# Patient Record
Sex: FEMALE | NOT HISPANIC OR LATINO | URBAN - METROPOLITAN AREA
[De-identification: names, ages, dates, MRNs, and addresses within clinical notes are randomized per-mention and may not be internally consistent; named-entity substitution may affect disease eponyms.]

---

## 2020-06-03 ENCOUNTER — OFFICE VISIT (OUTPATIENT)
Dept: URBAN - METROPOLITAN AREA SURGERY CENTER 5 | Facility: SURGERY CENTER | Age: 80
End: 2020-06-03

## 2020-07-09 ENCOUNTER — TELEPHONE ENCOUNTER (OUTPATIENT)
Dept: URBAN - METROPOLITAN AREA CLINIC 9 | Facility: CLINIC | Age: 80
End: 2020-07-09

## 2020-07-13 ENCOUNTER — OFFICE VISIT (OUTPATIENT)
Dept: URBAN - METROPOLITAN AREA CLINIC 7 | Facility: CLINIC | Age: 80
End: 2020-07-13

## 2020-08-13 ENCOUNTER — OFFICE VISIT (OUTPATIENT)
Dept: URBAN - METROPOLITAN AREA CLINIC 7 | Facility: CLINIC | Age: 80
End: 2020-08-13

## 2021-04-01 ENCOUNTER — OFFICE VISIT (OUTPATIENT)
Age: 81
End: 2021-04-01

## 2021-08-31 ENCOUNTER — TELEPHONE ENCOUNTER (OUTPATIENT)
Dept: URBAN - METROPOLITAN AREA CLINIC 9 | Facility: CLINIC | Age: 81
End: 2021-08-31

## 2021-09-01 ENCOUNTER — OFFICE VISIT (OUTPATIENT)
Age: 81
End: 2021-09-01

## 2021-10-04 ENCOUNTER — OFFICE VISIT (OUTPATIENT)
Dept: URBAN - METROPOLITAN AREA CLINIC 7 | Facility: CLINIC | Age: 81
End: 2021-10-04

## 2022-01-27 ENCOUNTER — OFFICE VISIT (OUTPATIENT)
Dept: URBAN - METROPOLITAN AREA CLINIC 7 | Facility: CLINIC | Age: 82
End: 2022-01-27

## 2022-01-27 ENCOUNTER — TELEPHONE ENCOUNTER (OUTPATIENT)
Dept: URBAN - METROPOLITAN AREA CLINIC 9 | Facility: CLINIC | Age: 82
End: 2022-01-27

## 2022-03-22 ENCOUNTER — OFFICE VISIT (OUTPATIENT)
Dept: URBAN - METROPOLITAN AREA SURGERY CENTER 5 | Facility: SURGERY CENTER | Age: 82
End: 2022-03-22

## 2022-07-30 ENCOUNTER — TELEPHONE ENCOUNTER (OUTPATIENT)
Age: 82
End: 2022-07-30

## 2022-07-30 RX ORDER — OMEPRAZOLE 20 MG/1
1 (ONE) TABLET, DELAYED RELEASE ORAL DAILY
Qty: 0 | Refills: 16 | OUTPATIENT
Start: 2015-04-02 | End: 2015-05-28

## 2022-07-30 RX ORDER — CYANOCOBALAMIN 1000 UG/ML
1 INJECTION INTRAMUSCULAR; SUBCUTANEOUS
Qty: 0 | Refills: 16 | OUTPATIENT
Start: 2012-05-16 | End: 2014-01-09

## 2022-07-30 RX ORDER — CIPROFLOXACIN HYDROCHLORIDE 500 MG/1
1 (ONE) TABLET, FILM COATED ORAL
Qty: 0 | Refills: 2 | OUTPATIENT
Start: 2017-08-18 | End: 2017-09-01

## 2022-07-30 RX ORDER — MESALAMINE 500 MG/1
2 (TWO) CAPSULE ORAL
Qty: 0 | Refills: 2 | OUTPATIENT
Start: 2020-05-14 | End: 2020-07-01

## 2022-07-30 RX ORDER — MESALAMINE 500 MG/1
2 (TWO) CAPSULE ER CAPSULE ORAL
Qty: 0 | Refills: 2 | OUTPATIENT
Start: 2015-09-22 | End: 2015-11-09

## 2022-07-30 RX ORDER — CIPROFLOXACIN HYDROCHLORIDE 500 MG/1
1 (ONE) TABLET, FILM COATED ORAL
Qty: 0 | Refills: 2 | OUTPATIENT
Start: 2015-10-21 | End: 2015-11-04

## 2022-07-30 RX ORDER — PREDNISONE 10 MG/1
1 TABLET ORAL
Qty: 0 | Refills: 2 | OUTPATIENT
Start: 2014-01-30 | End: 2014-02-20

## 2022-07-30 RX ORDER — PREDNISONE 10 MG/1
4 TABLET ORAL
Qty: 0 | Refills: 2 | OUTPATIENT
Start: 2018-03-19 | End: 2018-04-18

## 2022-07-30 RX ORDER — MESALAMINE 1000 MG/1
1 (ONE) SUPPOSITORY RECTAL AT BEDTIME
Qty: 0 | Refills: 2 | OUTPATIENT
Start: 2016-01-18 | End: 2016-02-17

## 2022-07-30 RX ORDER — HYDROCORTISONE ACETATE 25 MG/1
1 SUPPOSITORY RECTAL AT BEDTIME
Qty: 0 | Refills: 2 | OUTPATIENT
Start: 2012-11-30 | End: 2012-12-21

## 2022-07-30 RX ORDER — MESALAMINE 1000 MG/1
1 (ONE) SUPPOSITORY RECTAL AT BEDTIME
Qty: 0 | Refills: 3 | OUTPATIENT
Start: 2012-02-08 | End: 2012-05-10

## 2022-07-30 RX ORDER — CIPROFLOXACIN HCL 500 MG
1 (ONE) TABLET ORAL
Qty: 0 | Refills: 2 | OUTPATIENT
Start: 2012-04-26 | End: 2012-05-06

## 2022-07-30 RX ORDER — PREDNISONE 10 MG/1
4 TABLET ORAL DAILY
Qty: 0 | Refills: 3 | OUTPATIENT
Start: 2015-10-21 | End: 2016-09-21

## 2022-07-30 RX ORDER — OXYCODONE AND ACETAMINOPHEN 5; 325 MG/1; MG/1
1-2 TABLET ORAL
Qty: 0 | Refills: 2 | OUTPATIENT
Start: 2011-06-27 | End: 2011-07-05

## 2022-07-30 RX ORDER — METRONIDAZOLE 375 MG/1
1 CAPSULE ORAL
Qty: 0 | Refills: 2 | OUTPATIENT
Start: 2012-04-26 | End: 2012-05-06

## 2022-07-30 RX ORDER — PREDNISONE 10 MG/1
1 TABLET ORAL
Qty: 0 | Refills: 2 | OUTPATIENT
Start: 2012-05-10 | End: 2012-06-21

## 2022-07-30 RX ORDER — AMOXICILLIN/POTASSIUM CLAV 500-125 MG
1 TABLET ORAL
Qty: 0 | Refills: 2 | OUTPATIENT
Start: 2020-07-13 | End: 2020-07-23

## 2022-07-30 RX ORDER — PREDNISONE 10 MG/1
4 TABLET ORAL
Qty: 0 | Refills: 2 | OUTPATIENT
Start: 2020-04-20 | End: 2020-05-04

## 2022-07-30 RX ORDER — METRONIDAZOLE 375 MG/1
1 CAPSULE ORAL
Qty: 0 | Refills: 2 | OUTPATIENT
Start: 2011-06-27 | End: 2011-07-07

## 2022-07-30 RX ORDER — HYDROCORTISONE ACETATE 25 MG/1
1 SUPPOSITORY RECTAL AT BEDTIME
Qty: 0 | Refills: 2 | OUTPATIENT
Start: 2013-02-12 | End: 2013-03-05

## 2022-07-30 RX ORDER — PREDNISONE 10 MG/1
3 (THREE) TABLET ORAL DAILY
Qty: 0 | Refills: 3 | OUTPATIENT
Start: 2015-08-07 | End: 2015-09-21

## 2022-07-30 RX ORDER — PREDNISONE 10 MG/1
2 (TWO) TABLET ORAL DAILY
Qty: 0 | Refills: 3 | OUTPATIENT
Start: 2015-01-05 | End: 2015-04-02

## 2022-07-30 RX ORDER — CIPROFLOXACIN HCL 500 MG
1 (ONE) TABLET ORAL
Qty: 0 | Refills: 2 | OUTPATIENT
Start: 2014-01-09 | End: 2014-01-23

## 2022-07-30 RX ORDER — POLYETHYLENE GLYCOL 3350, SODIUM SULFATE, SODIUM CHLORIDE, POTASSIUM CHLORIDE, ASCORBIC ACID, SODIUM ASCORBATE 140-9-5.2G
1 (ONE) KIT ORAL AS DIRECTED
Qty: 0 | Refills: 2 | OUTPATIENT
Start: 2022-01-27 | End: 2022-01-28

## 2022-07-30 RX ORDER — CIPROFLOXACIN HYDROCHLORIDE 500 MG/1
1 (ONE) TABLET, FILM COATED ORAL
Qty: 0 | Refills: 2 | OUTPATIENT
Start: 2019-03-19 | End: 2019-03-29

## 2022-07-30 RX ORDER — METRONIDAZOLE 375 MG/1
1 CAPSULE ORAL
Qty: 0 | Refills: 2 | OUTPATIENT
Start: 2011-05-27 | End: 2011-06-06

## 2022-07-30 RX ORDER — MESALAMINE 1000 MG/1
1 (ONE) SUPPOSITORY RECTAL AT BEDTIME
Qty: 0 | Refills: 2 | OUTPATIENT
Start: 2013-10-07 | End: 2013-11-06

## 2022-07-30 RX ORDER — AMOXICILLIN AND CLAVULANATE POTASSIUM 500; 125 MG/1; MG/1
1 TABLET, FILM COATED ORAL
Qty: 0 | Refills: 2 | OUTPATIENT
Start: 2017-12-19 | End: 2017-12-26

## 2022-07-30 RX ORDER — METRONIDAZOLE 375 MG/1
1 CAPSULE ORAL
Qty: 0 | Refills: 2 | OUTPATIENT
Start: 2012-05-10 | End: 2012-05-24

## 2022-07-30 RX ORDER — PREDNISONE 10 MG/1
4 TABLET ORAL
Qty: 0 | Refills: 2 | OUTPATIENT
Start: 2019-10-31 | End: 2019-11-14

## 2022-07-30 RX ORDER — CIPROFLOXACIN HYDROCHLORIDE 500 MG/1
1 (ONE) TABLET, FILM COATED ORAL
Qty: 0 | Refills: 2 | OUTPATIENT
Start: 2020-04-20 | End: 2020-04-30

## 2022-07-30 RX ORDER — COLESTIPOL HYDROCHLORIDE 1 G/1
1 (ONE) TABLET TABLET ORAL
Qty: 0 | Refills: 7 | OUTPATIENT
Start: 2015-05-28 | End: 2015-08-21

## 2022-07-30 RX ORDER — ONDANSETRON HYDROCHLORIDE 4 MG/1
1 (ONE) TABLET, FILM COATED ORAL
Qty: 0 | Refills: 4 | OUTPATIENT
Start: 2020-05-14 | End: 2020-08-13

## 2022-07-30 RX ORDER — METRONIDAZOLE 375 MG/1
1 (ONE) CAPSULE ORAL
Qty: 0 | Refills: 2 | OUTPATIENT
Start: 2014-12-17 | End: 2014-12-31

## 2022-07-30 RX ORDER — METRONIDAZOLE 375 MG/1
1 CAPSULE ORAL
Qty: 0 | Refills: 2 | OUTPATIENT
Start: 2014-01-09 | End: 2014-01-23

## 2022-07-30 RX ORDER — COLESTIPOL HYDROCHLORIDE 1 G/1
1 (ONE) TABLET, FILM COATED ORAL
Qty: 0 | Refills: 5 | OUTPATIENT
Start: 2015-10-21 | End: 2016-01-18

## 2022-07-30 RX ORDER — SODIUM SULFATE, POTASSIUM SULFATE, MAGNESIUM SULFATE 17.5; 3.13; 1.6 G/ML; G/ML; G/ML
1 SOLUTION, CONCENTRATE ORAL AS DIRECTED
Qty: 0 | Refills: 2 | OUTPATIENT
Start: 2015-08-04 | End: 2015-08-21

## 2022-07-30 RX ORDER — PREDNISONE 10 MG/1
1 TABLET ORAL
Qty: 0 | Refills: 2 | OUTPATIENT
Start: 2011-06-27 | End: 2011-08-08

## 2022-07-30 RX ORDER — METRONIDAZOLE 250 MG/1
1 (ONE) TABLET ORAL
Qty: 0 | Refills: 2 | OUTPATIENT
Start: 2015-10-21 | End: 2015-11-04

## 2022-07-30 RX ORDER — METRONIDAZOLE 250 MG/1
1 (ONE) TABLET ORAL
Qty: 0 | Refills: 2 | OUTPATIENT
Start: 2017-08-18 | End: 2017-09-01

## 2022-07-30 RX ORDER — CIPROFLOXACIN HCL 500 MG
1 (ONE) TABLET ORAL
Qty: 0 | Refills: 2 | OUTPATIENT
Start: 2012-05-10 | End: 2012-05-24

## 2022-07-30 RX ORDER — PREDNISONE 10 MG/1
4 TABLET ORAL DAILY
Qty: 0 | Refills: 3 | OUTPATIENT
Start: 2017-08-18 | End: 2017-12-19

## 2022-07-30 RX ORDER — DIPHENOXYLATE HYDROCHLORIDE AND ATROPINE SULFATE 2.5; .025 MG/1; MG/1
1-2 TABLET TABLET ORAL
Qty: 0 | Refills: 2 | OUTPATIENT
Start: 2015-10-21 | End: 2015-11-20

## 2022-07-30 RX ORDER — ESOMEPRAZOLE MAGNESIUM 40 MG
1 (ONE) CAPSULE,DELAYED RELEASE (ENTERIC COATED) ORAL DAILY
Qty: 0 | Refills: 16 | OUTPATIENT
Start: 2011-06-27 | End: 2014-08-11

## 2022-07-30 RX ORDER — METRONIDAZOLE 500 MG/1
1 (ONE) TABLET TABLET ORAL
Qty: 0 | Refills: 2 | OUTPATIENT
Start: 2018-03-19 | End: 2018-03-29

## 2022-07-30 RX ORDER — FLUCONAZOLE 100 MG/1
1 (ONE) TABLET ORAL DAILY
Qty: 0 | Refills: 2 | OUTPATIENT
Start: 2017-10-27 | End: 2017-11-03

## 2022-07-30 RX ORDER — METRONIDAZOLE 375 MG/1
1 CAPSULE ORAL
Qty: 0 | Refills: 2 | OUTPATIENT
Start: 2011-10-25 | End: 2011-11-08

## 2022-07-30 RX ORDER — FOLIC ACID 1 MG/1
1 (ONE) TABLET TABLET ORAL DAILY
Qty: 0 | Refills: 16 | OUTPATIENT
Start: 2015-08-21 | End: 2016-09-21

## 2022-07-30 RX ORDER — MESALAMINE 1000 MG/1
1 SUPPOSITORY RECTAL DAILY
Qty: 0 | Refills: 2 | OUTPATIENT
Start: 2012-02-08 | End: 2012-02-17

## 2022-07-30 RX ORDER — PREDNISONE 10 MG/1
4 TABLET ORAL
Qty: 0 | Refills: 2 | OUTPATIENT
Start: 2019-03-19 | End: 2019-04-18

## 2022-07-30 RX ORDER — CIPROFLOXACIN HYDROCHLORIDE 500 MG/1
1 (ONE) TABLET, FILM COATED ORAL
Qty: 0 | Refills: 2 | OUTPATIENT
Start: 2018-03-19 | End: 2018-03-29

## 2022-07-30 RX ORDER — PREDNISONE 10 MG/1
4 TABLET ORAL
Qty: 0 | Refills: 2 | OUTPATIENT
Start: 2022-01-27 | End: 2022-02-10

## 2022-07-31 ENCOUNTER — TELEPHONE ENCOUNTER (OUTPATIENT)
Age: 82
End: 2022-07-31

## 2022-07-31 RX ORDER — MESALAMINE 1000 MG/1
1 (ONE) SUPPOSITORY RECTAL AT BEDTIME
Qty: 0 | Refills: 2 | Status: ACTIVE | COMMUNITY
Start: 2016-01-18

## 2022-07-31 RX ORDER — METRONIDAZOLE 375 MG/1
1 CAPSULE ORAL
Qty: 0 | Refills: 2 | Status: ACTIVE | COMMUNITY
Start: 2011-05-27

## 2022-07-31 RX ORDER — METRONIDAZOLE 250 MG/1
1 (ONE) TABLET ORAL
Qty: 0 | Refills: 2 | Status: ACTIVE | COMMUNITY
Start: 2017-08-18

## 2022-07-31 RX ORDER — PREDNISONE 10 MG/1
4 TABLET ORAL
Qty: 0 | Refills: 2 | Status: ACTIVE | COMMUNITY
Start: 2020-04-20

## 2022-07-31 RX ORDER — CIPROFLOXACIN HCL 500 MG
1 (ONE) TABLET ORAL
Qty: 0 | Refills: 2 | Status: ACTIVE | COMMUNITY
Start: 2012-05-10

## 2022-07-31 RX ORDER — FERROUS SULFATE 325(65) MG
1 TABLET ORAL
Qty: 0 | Refills: 16 | Status: ACTIVE | COMMUNITY
Start: 2015-08-21

## 2022-07-31 RX ORDER — PREDNISONE 10 MG/1
1 TABLET ORAL
Qty: 0 | Refills: 2 | Status: ACTIVE | COMMUNITY
Start: 2012-05-10

## 2022-07-31 RX ORDER — CIPROFLOXACIN HCL 500 MG
1 (ONE) TABLET ORAL
Qty: 0 | Refills: 2 | Status: ACTIVE | COMMUNITY
Start: 2012-04-26

## 2022-07-31 RX ORDER — PANTOPRAZOLE 20 MG/1
1 (ONE) TABLET, DELAYED RELEASE ORAL
Qty: 0 | Refills: 16 | Status: ACTIVE | COMMUNITY
Start: 2020-05-14

## 2022-07-31 RX ORDER — MESALAMINE 500 MG/1
2 (TWO) CAPSULE ORAL
Qty: 0 | Refills: 16 | Status: ACTIVE | COMMUNITY
Start: 2015-09-21

## 2022-07-31 RX ORDER — OMEPRAZOLE 20 MG/1
1 (ONE) TABLET, DELAYED RELEASE ORAL DAILY
Qty: 0 | Refills: 16 | Status: ACTIVE | COMMUNITY
Start: 2015-04-02

## 2022-07-31 RX ORDER — HYDROCORTISONE ACETATE 25 MG/1
1 SUPPOSITORY RECTAL AT BEDTIME
Qty: 0 | Refills: 2 | Status: ACTIVE | COMMUNITY
Start: 2012-11-30

## 2022-07-31 RX ORDER — CIPROFLOXACIN HCL 500 MG
1 (ONE) TABLET ORAL
Qty: 0 | Refills: 3 | Status: ACTIVE | COMMUNITY
Start: 2011-06-27

## 2022-07-31 RX ORDER — AMOXICILLIN AND CLAVULANATE POTASSIUM 500; 125 MG/1; MG/1
1 TABLET, FILM COATED ORAL
Qty: 0 | Refills: 2 | Status: ACTIVE | COMMUNITY
Start: 2017-12-19

## 2022-07-31 RX ORDER — METRONIDAZOLE 375 MG/1
1 (ONE) CAPSULE ORAL
Qty: 0 | Refills: 2 | Status: ACTIVE | COMMUNITY
Start: 2014-12-17

## 2022-07-31 RX ORDER — METRONIDAZOLE 375 MG/1
1 CAPSULE ORAL
Qty: 0 | Refills: 2 | Status: ACTIVE | COMMUNITY
Start: 2011-06-27

## 2022-07-31 RX ORDER — METRONIDAZOLE 500 MG/1
1 (ONE) TABLET TABLET ORAL
Qty: 0 | Refills: 2 | Status: ACTIVE | COMMUNITY
Start: 2018-03-19

## 2022-07-31 RX ORDER — BUDESONIDE 3 MG/1
1 (ONE) CAPSULE, COATED PELLETS ORAL
Qty: 0 | Refills: 3 | Status: ACTIVE | COMMUNITY
Start: 2020-08-13

## 2022-07-31 RX ORDER — FERROUS SULFATE 325(65) MG
1 TABLET ORAL
Qty: 0 | Refills: 16 | Status: ACTIVE | COMMUNITY
Start: 2012-05-16

## 2022-07-31 RX ORDER — PREDNISONE 10 MG/1
3 (THREE) TABLET ORAL DAILY
Qty: 0 | Refills: 3 | Status: ACTIVE | COMMUNITY
Start: 2015-08-07

## 2022-07-31 RX ORDER — METRONIDAZOLE 375 MG/1
1 CAPSULE ORAL
Qty: 0 | Refills: 2 | Status: ACTIVE | COMMUNITY
Start: 2012-04-26

## 2022-07-31 RX ORDER — CIPROFLOXACIN HYDROCHLORIDE 500 MG/1
1 (ONE) TABLET, FILM COATED ORAL
Qty: 0 | Refills: 2 | Status: ACTIVE | COMMUNITY
Start: 2017-08-18

## 2022-07-31 RX ORDER — PREDNISONE 10 MG/1
1 TABLET ORAL
Qty: 0 | Refills: 2 | Status: ACTIVE | COMMUNITY
Start: 2014-01-30

## 2022-07-31 RX ORDER — COLESTIPOL HYDROCHLORIDE 1 G/1
1 (ONE) TABLET, FILM COATED ORAL
Qty: 0 | Refills: 5 | Status: ACTIVE | COMMUNITY
Start: 2015-10-21

## 2022-07-31 RX ORDER — ONDANSETRON HYDROCHLORIDE 4 MG/1
1 (ONE) TABLET, FILM COATED ORAL
Qty: 0 | Refills: 4 | Status: ACTIVE | COMMUNITY
Start: 2020-05-14

## 2022-07-31 RX ORDER — MESALAMINE 500 MG/1
2 (TWO) CAPSULE ORAL
Qty: 0 | Refills: 2 | Status: ACTIVE | COMMUNITY
Start: 2020-05-14

## 2022-07-31 RX ORDER — CIPROFLOXACIN HYDROCHLORIDE 500 MG/1
1 (ONE) TABLET, FILM COATED ORAL
Qty: 0 | Refills: 2 | Status: ACTIVE | COMMUNITY
Start: 2020-04-20

## 2022-07-31 RX ORDER — METRONIDAZOLE 375 MG/1
1 CAPSULE ORAL
Qty: 0 | Refills: 2 | Status: ACTIVE | COMMUNITY
Start: 2012-05-10

## 2022-07-31 RX ORDER — FOLIC ACID 1 MG/1
1 (ONE) TABLET TABLET ORAL DAILY
Qty: 0 | Refills: 16 | Status: ACTIVE | COMMUNITY
Start: 2015-08-21

## 2022-07-31 RX ORDER — PREDNISONE 10 MG/1
4 TABLET ORAL
Qty: 0 | Refills: 2 | Status: ACTIVE | COMMUNITY
Start: 2019-10-31

## 2022-07-31 RX ORDER — PREDNISONE 10 MG/1
4 TABLET ORAL
Qty: 0 | Refills: 2 | Status: ACTIVE | COMMUNITY
Start: 2018-03-19

## 2022-07-31 RX ORDER — CIPROFLOXACIN HCL 500 MG
1 (ONE) TABLET ORAL
Qty: 0 | Refills: 2 | Status: ACTIVE | COMMUNITY
Start: 2014-01-09

## 2022-07-31 RX ORDER — AMOXICILLIN/POTASSIUM CLAV 500-125 MG
1 TABLET ORAL
Qty: 0 | Refills: 2 | Status: ACTIVE | COMMUNITY
Start: 2020-07-13

## 2022-07-31 RX ORDER — PREDNISONE 10 MG/1
4 TABLET ORAL DAILY
Qty: 0 | Refills: 3 | Status: ACTIVE | COMMUNITY
Start: 2017-08-18

## 2022-07-31 RX ORDER — MESALAMINE 1000 MG/1
1 SUPPOSITORY RECTAL DAILY
Qty: 0 | Refills: 2 | Status: ACTIVE | COMMUNITY
Start: 2012-02-08

## 2022-07-31 RX ORDER — MESALAMINE 1000 MG/1
1 (ONE) SUPPOSITORY RECTAL AT BEDTIME
Qty: 0 | Refills: 2 | Status: ACTIVE | COMMUNITY
Start: 2013-10-07

## 2022-07-31 RX ORDER — CYANOCOBALAMIN 1000 UG/ML
1 INJECTION INTRAMUSCULAR; SUBCUTANEOUS
Qty: 0 | Refills: 16 | Status: ACTIVE | COMMUNITY
Start: 2012-05-16

## 2022-07-31 RX ORDER — ESOMEPRAZOLE MAGNESIUM 40 MG
1 (ONE) CAPSULE,DELAYED RELEASE (ENTERIC COATED) ORAL DAILY
Qty: 0 | Refills: 16 | Status: ACTIVE | COMMUNITY
Start: 2011-06-27

## 2022-07-31 RX ORDER — MESALAMINE 500 MG/1
2 (TWO) CAPSULE ER CAPSULE ORAL
Qty: 0 | Refills: 2 | Status: ACTIVE | COMMUNITY
Start: 2015-09-22

## 2022-07-31 RX ORDER — PREDNISONE 10 MG/1
4 TABLET ORAL DAILY
Qty: 0 | Refills: 3 | Status: ACTIVE | COMMUNITY
Start: 2015-10-21

## 2022-07-31 RX ORDER — FLUCONAZOLE 100 MG/1
1 (ONE) TABLET ORAL DAILY
Qty: 0 | Refills: 2 | Status: ACTIVE | COMMUNITY
Start: 2017-10-27

## 2022-07-31 RX ORDER — PREDNISONE 10 MG/1
1 TABLET ORAL
Qty: 0 | Refills: 2 | Status: ACTIVE | COMMUNITY
Start: 2011-06-27

## 2022-07-31 RX ORDER — PREDNISONE 10 MG/1
4 TABLET ORAL
Qty: 0 | Refills: 2 | Status: ACTIVE | COMMUNITY
Start: 2019-03-19

## 2022-07-31 RX ORDER — OXYCODONE AND ACETAMINOPHEN 5; 325 MG/1; MG/1
1-2 TABLET ORAL
Qty: 0 | Refills: 2 | Status: ACTIVE | COMMUNITY
Start: 2011-06-27

## 2022-07-31 RX ORDER — PREDNISONE 10 MG/1
1 TABLET ORAL
Qty: 0 | Refills: 2 | Status: ACTIVE | COMMUNITY
Start: 2014-01-29

## 2022-07-31 RX ORDER — PREDNISONE 10 MG/1
4 TABLET ORAL
Qty: 0 | Refills: 2 | Status: ACTIVE | COMMUNITY
Start: 2022-01-27

## 2022-07-31 RX ORDER — POLYETHYLENE GLYCOL 3350, SODIUM SULFATE, SODIUM CHLORIDE, POTASSIUM CHLORIDE, ASCORBIC ACID, SODIUM ASCORBATE 140-9-5.2G
1 (ONE) KIT ORAL AS DIRECTED
Qty: 0 | Refills: 2 | Status: ACTIVE | COMMUNITY
Start: 2022-01-27

## 2022-07-31 RX ORDER — METRONIDAZOLE 375 MG/1
1 CAPSULE ORAL
Qty: 0 | Refills: 2 | Status: ACTIVE | COMMUNITY
Start: 2011-10-25

## 2022-07-31 RX ORDER — METRONIDAZOLE 375 MG/1
1 CAPSULE ORAL
Qty: 0 | Refills: 2 | Status: ACTIVE | COMMUNITY
Start: 2014-01-09

## 2022-07-31 RX ORDER — DIPHENOXYLATE HYDROCHLORIDE AND ATROPINE SULFATE 2.5; .025 MG/1; MG/1
1-2 TABLET ORAL
Qty: 0 | Refills: 2 | Status: ACTIVE | COMMUNITY
Start: 2015-10-21

## 2022-07-31 RX ORDER — PREDNISONE 10 MG/1
1 TABLET ORAL
Qty: 0 | Refills: 2 | Status: ACTIVE | COMMUNITY
Start: 2014-01-09

## 2022-07-31 RX ORDER — PREDNISONE 10 MG/1
1 TABLET ORAL DAILY
Qty: 0 | Refills: 3 | Status: ACTIVE | COMMUNITY
Start: 2015-09-21

## 2022-07-31 RX ORDER — HYDROCORTISONE ACETATE 25 MG/1
1 SUPPOSITORY RECTAL AT BEDTIME
Qty: 0 | Refills: 2 | Status: ACTIVE | COMMUNITY
Start: 2013-02-12

## 2022-07-31 RX ORDER — METRONIDAZOLE 250 MG/1
1 (ONE) TABLET ORAL
Qty: 0 | Refills: 2 | Status: ACTIVE | COMMUNITY
Start: 2015-10-21

## 2022-07-31 RX ORDER — CIPROFLOXACIN HYDROCHLORIDE 500 MG/1
1 (ONE) TABLET, FILM COATED ORAL
Qty: 0 | Refills: 2 | Status: ACTIVE | COMMUNITY
Start: 2019-03-19

## 2022-07-31 RX ORDER — SODIUM SULFATE, POTASSIUM SULFATE, MAGNESIUM SULFATE 17.5; 3.13; 1.6 G/ML; G/ML; G/ML
1 SOLUTION, CONCENTRATE ORAL AS DIRECTED
Qty: 0 | Refills: 2 | Status: ACTIVE | COMMUNITY
Start: 2015-08-04

## 2022-07-31 RX ORDER — CIPROFLOXACIN HYDROCHLORIDE 500 MG/1
1 (ONE) TABLET, FILM COATED ORAL
Qty: 0 | Refills: 2 | Status: ACTIVE | COMMUNITY
Start: 2018-03-19

## 2022-07-31 RX ORDER — COLESTIPOL HYDROCHLORIDE 1 G/1
1 (ONE) TABLET TABLET ORAL
Qty: 0 | Refills: 7 | Status: ACTIVE | COMMUNITY
Start: 2015-05-28

## 2022-07-31 RX ORDER — CIPROFLOXACIN HYDROCHLORIDE 500 MG/1
1 (ONE) TABLET, FILM COATED ORAL
Qty: 0 | Refills: 2 | Status: ACTIVE | COMMUNITY
Start: 2015-10-21

## 2022-07-31 RX ORDER — PREDNISONE 10 MG/1
2 (TWO) TABLET ORAL DAILY
Qty: 0 | Refills: 3 | Status: ACTIVE | COMMUNITY
Start: 2015-01-05

## 2022-10-14 ENCOUNTER — OFFICE VISIT (OUTPATIENT)
Dept: URBAN - METROPOLITAN AREA CLINIC 7 | Facility: CLINIC | Age: 82
End: 2022-10-14
Payer: COMMERCIAL

## 2022-10-14 ENCOUNTER — WEB ENCOUNTER (OUTPATIENT)
Dept: URBAN - METROPOLITAN AREA CLINIC 7 | Facility: CLINIC | Age: 82
End: 2022-10-14

## 2022-10-14 ENCOUNTER — LAB OUTSIDE AN ENCOUNTER (OUTPATIENT)
Dept: URBAN - METROPOLITAN AREA CLINIC 7 | Facility: CLINIC | Age: 82
End: 2022-10-14

## 2022-10-14 VITALS
BODY MASS INDEX: 21.85 KG/M2 | WEIGHT: 128 LBS | TEMPERATURE: 97.8 F | SYSTOLIC BLOOD PRESSURE: 180 MMHG | HEIGHT: 64 IN | DIASTOLIC BLOOD PRESSURE: 94 MMHG

## 2022-10-14 DIAGNOSIS — K60.2 ANAL FISSURE: ICD-10-CM

## 2022-10-14 DIAGNOSIS — R14.0 BLOATING: ICD-10-CM

## 2022-10-14 DIAGNOSIS — K50.90 CROHN'S DISEASE: ICD-10-CM

## 2022-10-14 DIAGNOSIS — R10.13 EPIGASTRIC PAIN: ICD-10-CM

## 2022-10-14 DIAGNOSIS — K29.70 GASTRITIS: ICD-10-CM

## 2022-10-14 DIAGNOSIS — K91.30 GASTROINTESTINAL ANASTOMOTIC STRICTURE: ICD-10-CM

## 2022-10-14 PROBLEM — 34000006 CROHN'S DISEASE: Status: ACTIVE | Noted: 2022-10-14

## 2022-10-14 PROCEDURE — 99214 OFFICE O/P EST MOD 30 MIN: CPT | Performed by: STUDENT IN AN ORGANIZED HEALTH CARE EDUCATION/TRAINING PROGRAM

## 2022-10-14 RX ORDER — FERROUS SULFATE 325(65) MG
1 TABLET ORAL
Qty: 0 | Refills: 16 | Status: DISCONTINUED | COMMUNITY
Start: 2012-05-16

## 2022-10-14 RX ORDER — FOLIC ACID 1 MG/1
1 (ONE) TABLET TABLET ORAL DAILY
Qty: 0 | Refills: 16 | Status: DISCONTINUED | COMMUNITY
Start: 2015-08-21

## 2022-10-14 RX ORDER — ESOMEPRAZOLE MAGNESIUM 40 MG
1 (ONE) CAPSULE,DELAYED RELEASE (ENTERIC COATED) ORAL DAILY
Qty: 0 | Refills: 16 | Status: DISCONTINUED | COMMUNITY
Start: 2011-06-27

## 2022-10-14 RX ORDER — PANTOPRAZOLE SODIUM 40 MG/1
1 TABLET TABLET, DELAYED RELEASE ORAL ONCE A DAY
Qty: 30 | Refills: 2 | OUTPATIENT

## 2022-10-14 RX ORDER — COLESTIPOL HYDROCHLORIDE 1 G/1
1 (ONE) TABLET, FILM COATED ORAL
Qty: 0 | Refills: 5 | Status: DISCONTINUED | COMMUNITY
Start: 2015-10-21

## 2022-10-14 RX ORDER — CIPROFLOXACIN HCL 500 MG
1 (ONE) TABLET ORAL
Qty: 0 | Refills: 2 | Status: DISCONTINUED | COMMUNITY
Start: 2014-01-09

## 2022-10-14 RX ORDER — PREDNISONE 10 MG/1
4 TABLET ORAL
Qty: 0 | Refills: 2 | Status: DISCONTINUED | COMMUNITY
Start: 2018-03-19

## 2022-10-14 RX ORDER — BUDESONIDE 3 MG/1
1 (ONE) CAPSULE, COATED PELLETS ORAL
Qty: 0 | Refills: 3 | Status: DISCONTINUED | COMMUNITY
Start: 2020-08-13

## 2022-10-14 RX ORDER — AMOXICILLIN AND CLAVULANATE POTASSIUM 500; 125 MG/1; MG/1
1 TABLET, FILM COATED ORAL
Qty: 0 | Refills: 2 | Status: DISCONTINUED | COMMUNITY
Start: 2017-12-19

## 2022-10-14 RX ORDER — COLESTIPOL HYDROCHLORIDE 1 G/1
1 (ONE) TABLET TABLET ORAL
Qty: 0 | Refills: 7 | Status: DISCONTINUED | COMMUNITY
Start: 2015-05-28

## 2022-10-14 RX ORDER — OMEPRAZOLE 20 MG/1
1 (ONE) TABLET, DELAYED RELEASE ORAL DAILY
Qty: 0 | Refills: 16 | Status: DISCONTINUED | COMMUNITY
Start: 2015-04-02

## 2022-10-14 RX ORDER — METRONIDAZOLE 375 MG/1
1 CAPSULE ORAL
Qty: 0 | Refills: 2 | Status: DISCONTINUED | COMMUNITY
Start: 2011-06-27

## 2022-10-14 RX ORDER — POLYETHYLENE GLYCOL 3350, SODIUM SULFATE, SODIUM CHLORIDE, POTASSIUM CHLORIDE, ASCORBIC ACID, SODIUM ASCORBATE 140-9-5.2G
1 (ONE) KIT ORAL AS DIRECTED
Qty: 0 | Refills: 2 | Status: DISCONTINUED | COMMUNITY
Start: 2022-01-27

## 2022-10-14 RX ORDER — METRONIDAZOLE 250 MG/1
1 (ONE) TABLET ORAL
Qty: 0 | Refills: 2 | Status: DISCONTINUED | COMMUNITY
Start: 2015-10-21

## 2022-10-14 RX ORDER — METRONIDAZOLE 500 MG/1
1 (ONE) TABLET TABLET ORAL
Qty: 0 | Refills: 2 | Status: DISCONTINUED | COMMUNITY
Start: 2018-03-19

## 2022-10-14 RX ORDER — CYANOCOBALAMIN 1000 UG/ML
1 INJECTION INTRAMUSCULAR; SUBCUTANEOUS
Qty: 0 | Refills: 16 | Status: DISCONTINUED | COMMUNITY
Start: 2012-05-16

## 2022-10-14 RX ORDER — OXYCODONE AND ACETAMINOPHEN 5; 325 MG/1; MG/1
1-2 TABLET ORAL
Qty: 0 | Refills: 2 | Status: DISCONTINUED | COMMUNITY
Start: 2011-06-27

## 2022-10-14 RX ORDER — SODIUM SULFATE, POTASSIUM SULFATE, MAGNESIUM SULFATE 17.5; 3.13; 1.6 G/ML; G/ML; G/ML
1 SOLUTION, CONCENTRATE ORAL AS DIRECTED
Qty: 0 | Refills: 2 | Status: DISCONTINUED | COMMUNITY
Start: 2015-08-04

## 2022-10-14 RX ORDER — MESALAMINE 500 MG/1
2 (TWO) CAPSULE ER CAPSULE ORAL
Qty: 0 | Refills: 2 | Status: DISCONTINUED | COMMUNITY
Start: 2015-09-22

## 2022-10-14 RX ORDER — ONDANSETRON HYDROCHLORIDE 4 MG/1
1 (ONE) TABLET, FILM COATED ORAL
Qty: 0 | Refills: 4 | Status: DISCONTINUED | COMMUNITY
Start: 2020-05-14

## 2022-10-14 RX ORDER — PANTOPRAZOLE 20 MG/1
1 (ONE) TABLET, DELAYED RELEASE ORAL
Qty: 0 | Refills: 16 | Status: DISCONTINUED | COMMUNITY
Start: 2020-05-14

## 2022-10-14 RX ORDER — CIPROFLOXACIN HYDROCHLORIDE 500 MG/1
1 (ONE) TABLET, FILM COATED ORAL
Qty: 0 | Refills: 2 | Status: DISCONTINUED | COMMUNITY
Start: 2020-04-20

## 2022-10-14 RX ORDER — FLUCONAZOLE 100 MG/1
1 (ONE) TABLET ORAL DAILY
Qty: 0 | Refills: 2 | Status: DISCONTINUED | COMMUNITY
Start: 2017-10-27

## 2022-10-14 RX ORDER — MESALAMINE 1000 MG/1
1 SUPPOSITORY RECTAL DAILY
Qty: 0 | Refills: 2 | Status: DISCONTINUED | COMMUNITY
Start: 2012-02-08

## 2022-10-14 RX ORDER — DIPHENOXYLATE HYDROCHLORIDE AND ATROPINE SULFATE 2.5; .025 MG/1; MG/1
1-2 TABLET ORAL
Qty: 0 | Refills: 2 | Status: DISCONTINUED | COMMUNITY
Start: 2015-10-21

## 2022-10-14 RX ORDER — LEVOTHYROXINE SODIUM 100 UG/1
1 TABLET IN THE MORNING ON AN EMPTY STOMACH TABLET ORAL ONCE A DAY
Status: ACTIVE | COMMUNITY

## 2022-10-14 RX ORDER — HYDROCORTISONE ACETATE 25 MG/1
1 SUPPOSITORY RECTAL AT BEDTIME
Qty: 0 | Refills: 2 | Status: DISCONTINUED | COMMUNITY
Start: 2013-02-12

## 2022-10-14 RX ORDER — AMOXICILLIN/POTASSIUM CLAV 500-125 MG
1 TABLET ORAL
Qty: 0 | Refills: 2 | Status: DISCONTINUED | COMMUNITY
Start: 2020-07-13

## 2022-10-17 ENCOUNTER — LAB OUTSIDE AN ENCOUNTER (OUTPATIENT)
Dept: URBAN - METROPOLITAN AREA SURGERY CENTER 5 | Facility: SURGERY CENTER | Age: 82
End: 2022-10-17

## 2022-10-17 ENCOUNTER — CLAIMS CREATED FROM THE CLAIM WINDOW (OUTPATIENT)
Dept: URBAN - METROPOLITAN AREA SURGERY CENTER 5 | Facility: SURGERY CENTER | Age: 82
End: 2022-10-17
Payer: COMMERCIAL

## 2022-10-17 ENCOUNTER — CLAIMS CREATED FROM THE CLAIM WINDOW (OUTPATIENT)
Dept: URBAN - METROPOLITAN AREA CLINIC 8 | Facility: CLINIC | Age: 82
End: 2022-10-17
Payer: COMMERCIAL

## 2022-10-17 DIAGNOSIS — K29.70 CHRONIC ACITVE GASTRITIS (H.PYLORI NEGATIVE): ICD-10-CM

## 2022-10-17 DIAGNOSIS — K50.112 CROHN'S COLITIS, WITH INTESTINAL OBSTRUCTION: ICD-10-CM

## 2022-10-17 DIAGNOSIS — K44.9 DIAPHRAGMATIC HERNIA: ICD-10-CM

## 2022-10-17 DIAGNOSIS — K62.89 ACUTE PROCTITIS: ICD-10-CM

## 2022-10-17 DIAGNOSIS — K29.50 ANTRAL GASTRITIS: ICD-10-CM

## 2022-10-17 DIAGNOSIS — K62.1 ANAL AND RECTAL POLYP: ICD-10-CM

## 2022-10-17 PROCEDURE — 43239 EGD BIOPSY SINGLE/MULTIPLE: CPT | Performed by: STUDENT IN AN ORGANIZED HEALTH CARE EDUCATION/TRAINING PROGRAM

## 2022-10-17 PROCEDURE — 88305 TISSUE EXAM BY PATHOLOGIST: CPT | Performed by: STUDENT IN AN ORGANIZED HEALTH CARE EDUCATION/TRAINING PROGRAM

## 2022-10-17 PROCEDURE — 45380 COLONOSCOPY AND BIOPSY: CPT | Performed by: STUDENT IN AN ORGANIZED HEALTH CARE EDUCATION/TRAINING PROGRAM

## 2022-10-17 PROCEDURE — 88312 SPECIAL STAINS GROUP 1: CPT | Performed by: STUDENT IN AN ORGANIZED HEALTH CARE EDUCATION/TRAINING PROGRAM

## 2022-10-17 RX ORDER — LEVOTHYROXINE SODIUM 100 UG/1
1 TABLET IN THE MORNING ON AN EMPTY STOMACH TABLET ORAL ONCE A DAY
Status: ACTIVE | COMMUNITY

## 2022-10-17 RX ORDER — PANTOPRAZOLE SODIUM 40 MG/1
1 TABLET TABLET, DELAYED RELEASE ORAL ONCE A DAY
Qty: 30 | Refills: 2 | Status: ACTIVE | COMMUNITY

## 2022-11-01 ENCOUNTER — OFFICE VISIT (OUTPATIENT)
Dept: URBAN - METROPOLITAN AREA CLINIC 9 | Facility: CLINIC | Age: 82
End: 2022-11-01

## 2022-11-04 ENCOUNTER — OFFICE VISIT (OUTPATIENT)
Dept: URBAN - METROPOLITAN AREA CLINIC 7 | Facility: CLINIC | Age: 82
End: 2022-11-04
Payer: COMMERCIAL

## 2022-11-04 VITALS
SYSTOLIC BLOOD PRESSURE: 136 MMHG | BODY MASS INDEX: 21.51 KG/M2 | HEIGHT: 64 IN | WEIGHT: 126 LBS | DIASTOLIC BLOOD PRESSURE: 80 MMHG | TEMPERATURE: 97.7 F

## 2022-11-04 DIAGNOSIS — R93.5 ABNORMAL ABDOMINAL CT SCAN: ICD-10-CM

## 2022-11-04 DIAGNOSIS — K56.699 STENOSIS COLON: ICD-10-CM

## 2022-11-04 DIAGNOSIS — K29.70 GASTRITIS: ICD-10-CM

## 2022-11-04 DIAGNOSIS — K50.812 CROHN'S DISEASE OF BOTH SMALL AND LARGE INTESTINE WITH INTESTINAL OBSTRUCTION: ICD-10-CM

## 2022-11-04 DIAGNOSIS — K50.90 CROHN'S DISEASE: ICD-10-CM

## 2022-11-04 DIAGNOSIS — K91.30 GASTROINTESTINAL ANASTOMOTIC STRICTURE: ICD-10-CM

## 2022-11-04 DIAGNOSIS — K63.89 SMALL INTESTINAL BACTERIAL OVERGROWTH: ICD-10-CM

## 2022-11-04 PROBLEM — 19132000: Status: ACTIVE | Noted: 2022-10-17

## 2022-11-04 PROCEDURE — 99214 OFFICE O/P EST MOD 30 MIN: CPT | Performed by: STUDENT IN AN ORGANIZED HEALTH CARE EDUCATION/TRAINING PROGRAM

## 2022-11-04 RX ORDER — LEVOTHYROXINE SODIUM 100 UG/1
1 TABLET IN THE MORNING ON AN EMPTY STOMACH TABLET ORAL ONCE A DAY
Status: ACTIVE | COMMUNITY

## 2022-11-04 RX ORDER — PANTOPRAZOLE SODIUM 40 MG/1
1 TABLET TABLET, DELAYED RELEASE ORAL ONCE A DAY
Qty: 30 | Refills: 2 | Status: ACTIVE | COMMUNITY

## 2022-11-04 NOTE — HPI-TODAY'S VISIT:
83 YO Female with presents for increased bloating, pyrosis, chronic GERD, previous history of fistualizing Crohn's disease with previous surgical correction.   States she has been having epigastric and lower abdominal pain associated with bloating for the past couple of months.  Previuos endoscopic history reviewed with patient.  States that she was previously seen with Dr. Garza and during flares would have endoscopci evaluation and medical treatment.  Thoroughly explained risk/benefits/alternatives of procedures.    Interval HIstory 11/4/22: Has sbeen feeling nauseous.  Previous Colonoscopy done with evidence of anastomotoci narrowing.  Follow up CT AP without evidence of stenosis or narrowing.  Will give patient a trial of miralax so allowing for better passage of stool and trial of anti-nausea medication.  Will re-evalaute for possible repeat colonosocpy after trial with dilation of stricture.  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

## 2022-11-15 ENCOUNTER — TELEPHONE ENCOUNTER (OUTPATIENT)
Dept: URBAN - METROPOLITAN AREA CLINIC 7 | Facility: CLINIC | Age: 82
End: 2022-11-15

## 2022-11-15 PROBLEM — 64226004: Status: ACTIVE | Noted: 2022-11-15

## 2022-11-15 PROBLEM — 422587007: Status: ACTIVE | Noted: 2022-11-15

## 2022-11-15 RX ORDER — ONDANSETRON 4 MG/1
1 TABLET ON THE TONGUE AND ALLOW TO DISSOLVE TABLET, ORALLY DISINTEGRATING ORAL ONCE A DAY
Qty: 30 | OUTPATIENT
Start: 2022-11-15

## 2022-11-15 RX ORDER — PREDNISONE 20 MG/1
1 TABLET TABLET ORAL ONCE A DAY
Qty: 30 | OUTPATIENT
Start: 2022-11-15 | End: 2022-12-15

## 2022-11-15 RX ORDER — PANTOPRAZOLE SODIUM 40 MG/1
1 TABLET TABLET, DELAYED RELEASE ORAL ONCE A DAY
Qty: 30 | Refills: 2 | Status: ACTIVE | COMMUNITY

## 2022-11-15 RX ORDER — LEVOTHYROXINE SODIUM 100 UG/1
1 TABLET IN THE MORNING ON AN EMPTY STOMACH TABLET ORAL ONCE A DAY
Status: ACTIVE | COMMUNITY

## 2022-11-22 ENCOUNTER — OFFICE VISIT (OUTPATIENT)
Dept: URBAN - METROPOLITAN AREA CLINIC 7 | Facility: CLINIC | Age: 82
End: 2022-11-22

## 2022-12-12 ENCOUNTER — LAB OUTSIDE AN ENCOUNTER (OUTPATIENT)
Dept: URBAN - METROPOLITAN AREA CLINIC 7 | Facility: CLINIC | Age: 82
End: 2022-12-12

## 2022-12-12 ENCOUNTER — OFFICE VISIT (OUTPATIENT)
Dept: URBAN - METROPOLITAN AREA CLINIC 7 | Facility: CLINIC | Age: 82
End: 2022-12-12
Payer: COMMERCIAL

## 2022-12-12 VITALS
TEMPERATURE: 98 F | BODY MASS INDEX: 21.41 KG/M2 | HEIGHT: 64 IN | SYSTOLIC BLOOD PRESSURE: 120 MMHG | WEIGHT: 125.4 LBS | DIASTOLIC BLOOD PRESSURE: 75 MMHG

## 2022-12-12 DIAGNOSIS — K50.90 CROHN'S DISEASE: ICD-10-CM

## 2022-12-12 DIAGNOSIS — K29.70 GASTRITIS: ICD-10-CM

## 2022-12-12 DIAGNOSIS — K59.09 CHRONIC CONSTIPATION: ICD-10-CM

## 2022-12-12 DIAGNOSIS — K91.30 ANASTOMOTIC STRICTURE OF COLORECTAL REGION: ICD-10-CM

## 2022-12-12 DIAGNOSIS — R10.13 EPIGASTRIC PAIN: ICD-10-CM

## 2022-12-12 PROBLEM — 236069009: Status: ACTIVE | Noted: 2022-12-12

## 2022-12-12 PROCEDURE — 99214 OFFICE O/P EST MOD 30 MIN: CPT | Performed by: STUDENT IN AN ORGANIZED HEALTH CARE EDUCATION/TRAINING PROGRAM

## 2022-12-12 RX ORDER — PANTOPRAZOLE SODIUM 40 MG/1
1 TABLET TABLET, DELAYED RELEASE ORAL ONCE A DAY
Qty: 30 | Refills: 2 | Status: ACTIVE | COMMUNITY

## 2022-12-12 RX ORDER — ONDANSETRON 4 MG/1
1 TABLET ON THE TONGUE AND ALLOW TO DISSOLVE TABLET, ORALLY DISINTEGRATING ORAL ONCE A DAY
Qty: 30 | Status: ACTIVE | COMMUNITY
Start: 2022-11-15

## 2022-12-12 RX ORDER — LEVOTHYROXINE SODIUM 100 UG/1
1 TABLET IN THE MORNING ON AN EMPTY STOMACH TABLET ORAL ONCE A DAY
Status: ACTIVE | COMMUNITY

## 2022-12-12 RX ORDER — PREDNISONE 20 MG/1
1 TABLET TABLET ORAL ONCE A DAY
Qty: 30 | Status: ACTIVE | COMMUNITY
Start: 2022-11-15 | End: 2022-12-15

## 2022-12-12 NOTE — HPI-TODAY'S VISIT:
81 YO Female with presents for increased bloating, pyrosis, chronic GERD, previous history of fistualizing Crohn's disease with previous surgical correction.   States she has been having epigastric and lower abdominal pain associated with bloating for the past couple of months.  Previuos endoscopic history reviewed with patient.  States that she was previously seen with Dr. Garza and during flares would have endoscopci evaluation and medical treatment.  Thoroughly explained risk/benefits/alternatives of procedures.    Interval HIstory 11/4/22: Has sbeen feeling nauseous.  Previous Colonoscopy done with evidence of anastomotoci narrowing.  Follow up CT AP without evidence of stenosis or narrowing.  Will give patient a trial of miralax so allowing for better passage of stool and trial of anti-nausea medication.  Will re-evalaute for possible repeat colonosocpy after trial with dilation of stricture.  Interval History 12/12/22: Presents with persistent epigastric and abdominal discomfort with bloating.  Having loose bowel movements, currently on Miralax.  Prveious colonoscopy with Anastomotic stricture, follow CT imaging without evidence of stricture.  Patient would like to re-attempt colonoscopy with possible dilation of narrowing if found.  Risk/benefits/alternatives thoroghly explained to patient.  Will scehdule EGD/Colonoscopy.  Still with incomplete evacuations and intermittent constipation.  Will further evaluate with MR DEfecography.  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

## 2022-12-19 ENCOUNTER — TELEPHONE ENCOUNTER (OUTPATIENT)
Dept: URBAN - METROPOLITAN AREA CLINIC 7 | Facility: CLINIC | Age: 82
End: 2022-12-19

## 2022-12-23 ENCOUNTER — OFFICE VISIT (OUTPATIENT)
Dept: URBAN - METROPOLITAN AREA SURGERY CENTER 9 | Facility: SURGERY CENTER | Age: 82
End: 2022-12-23
Payer: COMMERCIAL

## 2022-12-23 ENCOUNTER — CLAIMS CREATED FROM THE CLAIM WINDOW (OUTPATIENT)
Dept: URBAN - METROPOLITAN AREA CLINIC 4 | Facility: CLINIC | Age: 82
End: 2022-12-23
Payer: COMMERCIAL

## 2022-12-23 DIAGNOSIS — K50.90 ABDOMINAL PAIN DESPITE THERAPY FOR CROHN'S DISEASE: ICD-10-CM

## 2022-12-23 DIAGNOSIS — K29.70 CHRONIC ACITVE GASTRITIS (H.PYLORI NEGATIVE): ICD-10-CM

## 2022-12-23 DIAGNOSIS — K50.812 CROHN'S DISEASE OF BOTH SMALL AND LARGE INTESTINE WITH INTESTINAL OBSTRUCTION: ICD-10-CM

## 2022-12-23 DIAGNOSIS — K50.012 CROHN'S DISEASE OF DUODENUM WITH INTESTINAL OBSTRUCTION: ICD-10-CM

## 2022-12-23 DIAGNOSIS — K31.A0 GASTRIC INTESTINAL METAPLASIA, UNSPECIFIED: ICD-10-CM

## 2022-12-23 DIAGNOSIS — K91.30 GASTROINTESTINAL ANASTOMOTIC STRICTURE: ICD-10-CM

## 2022-12-23 PROCEDURE — 45386 COLONOSCOPY W/BALLOON DILAT: CPT | Performed by: STUDENT IN AN ORGANIZED HEALTH CARE EDUCATION/TRAINING PROGRAM

## 2022-12-23 PROCEDURE — 43239 EGD BIOPSY SINGLE/MULTIPLE: CPT | Performed by: STUDENT IN AN ORGANIZED HEALTH CARE EDUCATION/TRAINING PROGRAM

## 2022-12-23 PROCEDURE — 88342 IMHCHEM/IMCYTCHM 1ST ANTB: CPT | Performed by: PATHOLOGY

## 2022-12-23 PROCEDURE — 88305 TISSUE EXAM BY PATHOLOGIST: CPT | Performed by: PATHOLOGY

## 2022-12-23 RX ORDER — ONDANSETRON 4 MG/1
1 TABLET ON THE TONGUE AND ALLOW TO DISSOLVE TABLET, ORALLY DISINTEGRATING ORAL ONCE A DAY
Qty: 30 | Status: ACTIVE | COMMUNITY
Start: 2022-11-15

## 2022-12-23 RX ORDER — PANTOPRAZOLE SODIUM 40 MG/1
1 TABLET TABLET, DELAYED RELEASE ORAL ONCE A DAY
Qty: 30 | Refills: 2 | Status: ACTIVE | COMMUNITY

## 2022-12-23 RX ORDER — LEVOTHYROXINE SODIUM 100 UG/1
1 TABLET IN THE MORNING ON AN EMPTY STOMACH TABLET ORAL ONCE A DAY
Status: ACTIVE | COMMUNITY

## 2023-01-04 ENCOUNTER — OFFICE VISIT (OUTPATIENT)
Dept: URBAN - METROPOLITAN AREA CLINIC 9 | Facility: CLINIC | Age: 83
End: 2023-01-04
Payer: COMMERCIAL

## 2023-01-04 VITALS
HEIGHT: 64 IN | SYSTOLIC BLOOD PRESSURE: 126 MMHG | BODY MASS INDEX: 20.49 KG/M2 | WEIGHT: 120 LBS | DIASTOLIC BLOOD PRESSURE: 74 MMHG

## 2023-01-04 DIAGNOSIS — K91.30 ANASTOMOTIC STRICTURE OF COLORECTAL REGION: ICD-10-CM

## 2023-01-04 DIAGNOSIS — M62.89 PELVIC FLOOR DYSFUNCTION: ICD-10-CM

## 2023-01-04 DIAGNOSIS — R10.13 EPIGASTRIC PAIN: ICD-10-CM

## 2023-01-04 DIAGNOSIS — K50.812 CROHN'S DISEASE OF BOTH SMALL AND LARGE INTESTINE WITH INTESTINAL OBSTRUCTION: ICD-10-CM

## 2023-01-04 DIAGNOSIS — R14.0 BLOATING: ICD-10-CM

## 2023-01-04 PROBLEM — 79922009: Status: ACTIVE | Noted: 2022-10-14

## 2023-01-04 PROBLEM — 71833008: Status: ACTIVE | Noted: 2022-11-04

## 2023-01-04 PROBLEM — 116289008: Status: ACTIVE | Noted: 2022-10-14

## 2023-01-04 PROCEDURE — 99214 OFFICE O/P EST MOD 30 MIN: CPT | Performed by: STUDENT IN AN ORGANIZED HEALTH CARE EDUCATION/TRAINING PROGRAM

## 2023-01-04 RX ORDER — LEVOTHYROXINE SODIUM 100 UG/1
1 TABLET IN THE MORNING ON AN EMPTY STOMACH TABLET ORAL ONCE A DAY
Status: ACTIVE | COMMUNITY

## 2023-01-04 RX ORDER — BUDESONIDE 3 MG/1
2 CAPSULES CAPSULE, COATED PELLETS ORAL ONCE A DAY
Qty: 60 CAPSULE | Refills: 0 | OUTPATIENT
Start: 2023-01-04

## 2023-01-04 RX ORDER — PANTOPRAZOLE SODIUM 40 MG/1
1 TABLET TABLET, DELAYED RELEASE ORAL ONCE A DAY
Qty: 30 | Refills: 2 | Status: ACTIVE | COMMUNITY

## 2023-01-04 RX ORDER — ONDANSETRON 4 MG/1
1 TABLET ON THE TONGUE AND ALLOW TO DISSOLVE TABLET, ORALLY DISINTEGRATING ORAL ONCE A DAY
Qty: 30 | Status: ON HOLD | COMMUNITY
Start: 2022-11-15

## 2023-01-04 RX ORDER — PANTOPRAZOLE SODIUM 40 MG/1
1 TABLET TABLET, DELAYED RELEASE ORAL ONCE A DAY
Qty: 30 | Refills: 3 | OUTPATIENT

## 2023-01-04 NOTE — HPI-TODAY'S VISIT:
81 YO Female with presents for increased bloating, pyrosis, chronic GERD, previous history of fistualizing Crohn's disease with previous surgical correction.   States she has been having epigastric and lower abdominal pain associated with bloating for the past couple of months.  Previuos endoscopic history reviewed with patient.  States that she was previously seen with Dr. Garza and during flares would have endoscopci evaluation and medical treatment.  Thoroughly explained risk/benefits/alternatives of procedures.    Interval HIstory 11/4/22: Has sbeen feeling nauseous.  Previous Colonoscopy done with evidence of anastomotoci narrowing.  Follow up CT AP without evidence of stenosis or narrowing.  Will give patient a trial of miralax so allowing for better passage of stool and trial of anti-nausea medication.  Will re-evalaute for possible repeat colonosocpy after trial with dilation of stricture.  Interval History 12/12/22: Presents with persistent epigastric and abdominal discomfort with bloating.  Having loose bowel movements, currently on Miralax.  Prveious colonoscopy with Anastomotic stricture, follow CT imaging without evidence of stricture.  Patient would like to re-attempt colonoscopy with possible dilation of narrowing if found.  Risk/benefits/alternatives thoroghly explained to patient.  Will scehdule EGD/Colonoscopy.  Still with incomplete evacuations and intermittent constipation.  Will further evaluate with MR DEfecography.  IH 1/4/23: S/p Colonoscopy with dilation of anastomtoic stricture.  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  Advised patient for pelvic floor physical therapy prior to surgical evaluation, she is in agreement.  Advised patient ot take  PPI daily use to help iwth epigastric discomfort.   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

## 2023-02-27 ENCOUNTER — OFFICE VISIT (OUTPATIENT)
Dept: URBAN - METROPOLITAN AREA CLINIC 9 | Facility: CLINIC | Age: 83
End: 2023-02-27

## 2023-02-27 RX ORDER — PANTOPRAZOLE SODIUM 40 MG/1
1 TABLET TABLET, DELAYED RELEASE ORAL ONCE A DAY
Qty: 30 | Refills: 2 | Status: ACTIVE | COMMUNITY

## 2023-02-27 RX ORDER — ONDANSETRON 4 MG/1
1 TABLET ON THE TONGUE AND ALLOW TO DISSOLVE TABLET, ORALLY DISINTEGRATING ORAL ONCE A DAY
Qty: 30 | Status: ON HOLD | COMMUNITY
Start: 2022-11-15

## 2023-02-27 RX ORDER — PANTOPRAZOLE SODIUM 40 MG/1
1 TABLET TABLET, DELAYED RELEASE ORAL ONCE A DAY
Qty: 30 | Refills: 3 | Status: ACTIVE | COMMUNITY

## 2023-02-27 RX ORDER — LEVOTHYROXINE SODIUM 100 UG/1
1 TABLET IN THE MORNING ON AN EMPTY STOMACH TABLET ORAL ONCE A DAY
Status: ACTIVE | COMMUNITY

## 2023-02-27 RX ORDER — BUDESONIDE 3 MG/1
2 CAPSULES CAPSULE, COATED PELLETS ORAL ONCE A DAY
Qty: 60 CAPSULE | Refills: 0 | Status: ACTIVE | COMMUNITY
Start: 2023-01-04

## 2023-02-28 ENCOUNTER — TELEPHONE ENCOUNTER (OUTPATIENT)
Dept: URBAN - METROPOLITAN AREA CLINIC 7 | Facility: CLINIC | Age: 83
End: 2023-02-28

## 2023-03-02 ENCOUNTER — OFFICE VISIT (OUTPATIENT)
Dept: URBAN - METROPOLITAN AREA CLINIC 9 | Facility: CLINIC | Age: 83
End: 2023-03-02
Payer: COMMERCIAL

## 2023-03-02 VITALS
WEIGHT: 119 LBS | BODY MASS INDEX: 20.32 KG/M2 | HEIGHT: 64 IN | SYSTOLIC BLOOD PRESSURE: 140 MMHG | DIASTOLIC BLOOD PRESSURE: 80 MMHG

## 2023-03-02 DIAGNOSIS — K91.30 ANASTOMOTIC STRICTURE OF COLORECTAL REGION: ICD-10-CM

## 2023-03-02 DIAGNOSIS — K50.90 CROHN'S DISEASE: ICD-10-CM

## 2023-03-02 DIAGNOSIS — K29.70 GASTRITIS: ICD-10-CM

## 2023-03-02 DIAGNOSIS — R93.5 ABNORMAL ABDOMINAL CT SCAN: ICD-10-CM

## 2023-03-02 DIAGNOSIS — R10.13 ABDOMINAL PAIN, ACUTE, EPIGASTRIC: ICD-10-CM

## 2023-03-02 DIAGNOSIS — M62.89 PELVIC FLOOR DYSFUNCTION: ICD-10-CM

## 2023-03-02 PROBLEM — 236104004: Status: ACTIVE | Noted: 2022-12-12

## 2023-03-02 PROBLEM — 711263002: Status: ACTIVE | Noted: 2023-01-04

## 2023-03-02 PROCEDURE — 99214 OFFICE O/P EST MOD 30 MIN: CPT | Performed by: STUDENT IN AN ORGANIZED HEALTH CARE EDUCATION/TRAINING PROGRAM

## 2023-03-02 RX ORDER — ONDANSETRON 4 MG/1
1 TABLET ON THE TONGUE AND ALLOW TO DISSOLVE TABLET, ORALLY DISINTEGRATING ORAL ONCE A DAY
Qty: 30 | Status: ON HOLD | COMMUNITY
Start: 2022-11-15

## 2023-03-02 RX ORDER — BUDESONIDE 3 MG/1
2 CAPSULES CAPSULE, COATED PELLETS ORAL ONCE A DAY
Qty: 60 CAPSULE | Refills: 0 | Status: DISCONTINUED | COMMUNITY
Start: 2023-01-04

## 2023-03-02 RX ORDER — LACTULOSE 10 G/15ML
15 ML SOLUTION ORAL ONCE A DAY
Status: ACTIVE | COMMUNITY

## 2023-03-02 RX ORDER — PANTOPRAZOLE SODIUM 40 MG/1
1 TABLET TABLET, DELAYED RELEASE ORAL ONCE A DAY
Qty: 30 | Refills: 3 | Status: DISCONTINUED | COMMUNITY

## 2023-03-02 RX ORDER — PANTOPRAZOLE SODIUM 40 MG/1
1 TABLET TABLET, DELAYED RELEASE ORAL ONCE A DAY
Qty: 30 | Refills: 2 | Status: ACTIVE | COMMUNITY

## 2023-03-02 RX ORDER — LEVOTHYROXINE SODIUM 100 UG/1
1 TABLET IN THE MORNING ON AN EMPTY STOMACH TABLET ORAL ONCE A DAY
Status: ACTIVE | COMMUNITY

## 2023-03-02 NOTE — HPI-TODAY'S VISIT:
81 YO Female with presents for increased bloating, pyrosis, chronic GERD, previous history of fistualizing Crohn's disease with previous surgical correction.   States she has been having epigastric and lower abdominal pain associated with bloating for the past couple of months.  Previuos endoscopic history reviewed with patient.  States that she was previously seen with Dr. Garza and during flares would have endoscopci evaluation and medical treatment.  Thoroughly explained risk/benefits/alternatives of procedures.    Interval HIstory 11/4/22: Has sbeen feeling nauseous.  Previous Colonoscopy done with evidence of anastomotoci narrowing.  Follow up CT AP without evidence of stenosis or narrowing.  Will give patient a trial of miralax so allowing for better passage of stool and trial of anti-nausea medication.  Will re-evalaute for possible repeat colonosocpy after trial with dilation of stricture.  Interval History 12/12/22: Presents with persistent epigastric and abdominal discomfort with bloating.  Having loose bowel movements, currently on Miralax.  Prveious colonoscopy with Anastomotic stricture, follow CT imaging without evidence of stricture.  Patient would like to re-attempt colonoscopy with possible dilation of narrowing if found.  Risk/benefits/alternatives thoroghly explained to patient.  Will scehdule EGD/Colonoscopy.  Still with incomplete evacuations and intermittent constipation.  Will further evaluate with MR DEfecography.  IH 1/4/23: S/p Colonoscopy with dilation of anastomtoic stricture.  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  Advised patient for pelvic floor physical therapy prior to surgical evaluation, she is in agreement.  Advised patient ot take  PPI daily use to help iwth epigastric discomfort.   IH 3/2/23: f/u after hospitalization.  CT with barium without evidnece of obstruction or stricture.  NO inflamamtion. Patient is feeling much better now, given lactulose for possible blockage but now having more loose stools.  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

## 2023-05-11 ENCOUNTER — OFFICE VISIT (OUTPATIENT)
Dept: URBAN - METROPOLITAN AREA CLINIC 7 | Facility: CLINIC | Age: 83
End: 2023-05-11
Payer: COMMERCIAL

## 2023-05-11 VITALS
DIASTOLIC BLOOD PRESSURE: 76 MMHG | BODY MASS INDEX: 19.29 KG/M2 | WEIGHT: 113 LBS | TEMPERATURE: 97.6 F | HEIGHT: 64 IN | SYSTOLIC BLOOD PRESSURE: 124 MMHG

## 2023-05-11 DIAGNOSIS — R93.5 ABNORMAL ABDOMINAL CT SCAN: ICD-10-CM

## 2023-05-11 DIAGNOSIS — M62.89 PELVIC FLOOR DYSFUNCTION: ICD-10-CM

## 2023-05-11 DIAGNOSIS — K52.9 COLITIS: ICD-10-CM

## 2023-05-11 DIAGNOSIS — K56.699 STENOSIS COLON: ICD-10-CM

## 2023-05-11 DIAGNOSIS — K63.89 SMALL INTESTINAL BACTERIAL OVERGROWTH: ICD-10-CM

## 2023-05-11 DIAGNOSIS — K50.812 CROHN'S DISEASE OF BOTH SMALL AND LARGE INTESTINE WITH INTESTINAL OBSTRUCTION: ICD-10-CM

## 2023-05-11 PROCEDURE — 99214 OFFICE O/P EST MOD 30 MIN: CPT | Performed by: STUDENT IN AN ORGANIZED HEALTH CARE EDUCATION/TRAINING PROGRAM

## 2023-05-11 RX ORDER — LEVOTHYROXINE SODIUM 100 UG/1
1 TABLET IN THE MORNING ON AN EMPTY STOMACH TABLET ORAL ONCE A DAY
Status: ACTIVE | COMMUNITY

## 2023-05-11 RX ORDER — PANTOPRAZOLE SODIUM 40 MG/1
1 TABLET TABLET, DELAYED RELEASE ORAL ONCE A DAY
Qty: 30 | Refills: 2 | Status: ACTIVE | COMMUNITY

## 2023-05-11 RX ORDER — LACTULOSE 10 G/15ML
15 ML SOLUTION ORAL ONCE A DAY
Status: ACTIVE | COMMUNITY

## 2023-05-11 RX ORDER — CIPROFLOXACIN 500 MG/1
1 TABLET TABLET, FILM COATED ORAL
Qty: 20 TABLET | Refills: 0 | OUTPATIENT
Start: 2023-05-11 | End: 2023-05-14

## 2023-05-11 RX ORDER — ONDANSETRON 4 MG/1
1 TABLET ON THE TONGUE AND ALLOW TO DISSOLVE TABLET, ORALLY DISINTEGRATING ORAL ONCE A DAY
Qty: 30 | Status: ON HOLD | COMMUNITY
Start: 2022-11-15

## 2023-05-11 RX ORDER — PREDNISONE 10 MG/1
1 TABLET TABLET ORAL ONCE A DAY
Qty: 30 | OUTPATIENT
Start: 2023-05-11 | End: 2023-06-10

## 2023-05-11 NOTE — HPI-TODAY'S VISIT:
81 YO Female with presents for increased bloating, pyrosis, chronic GERD, previous history of fistualizing Crohn's disease with previous surgical correction.   States she has been having epigastric and lower abdominal pain associated with bloating for the past couple of months.  Previuos endoscopic history reviewed with patient.  States that she was previously seen with Dr. Garza and during flares would have endoscopci evaluation and medical treatment.  Thoroughly explained risk/benefits/alternatives of procedures.    Interval HIstory 11/4/22: Has sbeen feeling nauseous.  Previous Colonoscopy done with evidence of anastomotoci narrowing.  Follow up CT AP without evidence of stenosis or narrowing.  Will give patient a trial of miralax so allowing for better passage of stool and trial of anti-nausea medication.  Will re-evalaute for possible repeat colonosocpy after trial with dilation of stricture.  Interval History 12/12/22: Presents with persistent epigastric and abdominal discomfort with bloating.  Having loose bowel movements, currently on Miralax.  Prveious colonoscopy with Anastomotic stricture, follow CT imaging without evidence of stricture.  Patient would like to re-attempt colonoscopy with possible dilation of narrowing if found.  Risk/benefits/alternatives thoroghly explained to patient.  Will scehdule EGD/Colonoscopy.  Still with incomplete evacuations and intermittent constipation.  Will further evaluate with MR DEfecography.  IH 1/4/23: S/p Colonoscopy with dilation of anastomtoic stricture.  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  Advised patient for pelvic floor physical therapy prior to surgical evaluation, she is in agreement.  Advised patient ot take  PPI daily use to help iwth epigastric discomfort.   IH 3/2/23: f/u after hospitalization.  CT with barium without evidnece of obstruction or stricture.  NO inflamamtion. Patient is feeling much better now, given lactulose for possible blockage but now having more loose stools.  Advised patient for stool bulking agent.  IH 5/11/23: f/u.  Previous imaging reviewed with patient, gastorgraffin enema withotu stricture, CTAP in 2/2023 with DC wall thickening suggestive of inflammation or infection.  Previous colonoscopy reviewed with patient with dilation of anastomosis.   Has been feeling weak, finished 8 sessions of pelvic physical therapy.  No overt signs of bleeding.  Occasionally has urgency.   She has been doign Sitz baths which helps her occasionally.  Does not want to follow up with CR surgery at this time for paradoxical contractions of muscles of defecation.  Patient would like short term trial of Prednisone/Cirpor which helped her feel better inthe past.    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

## 2023-06-30 ENCOUNTER — OFFICE VISIT (OUTPATIENT)
Dept: URBAN - METROPOLITAN AREA CLINIC 7 | Facility: CLINIC | Age: 83
End: 2023-06-30
Payer: COMMERCIAL

## 2023-06-30 VITALS
BODY MASS INDEX: 19.12 KG/M2 | TEMPERATURE: 97.8 F | DIASTOLIC BLOOD PRESSURE: 80 MMHG | SYSTOLIC BLOOD PRESSURE: 128 MMHG | WEIGHT: 112 LBS | HEIGHT: 64 IN | RESPIRATION RATE: 16 BRPM

## 2023-06-30 DIAGNOSIS — K29.70 GASTRITIS: ICD-10-CM

## 2023-06-30 DIAGNOSIS — A04.8 HELICOBACTER PYLORI (H. PYLORI): ICD-10-CM

## 2023-06-30 DIAGNOSIS — K63.89 SMALL INTESTINAL BACTERIAL OVERGROWTH: ICD-10-CM

## 2023-06-30 DIAGNOSIS — R93.5 ABNORMAL ABDOMINAL CT SCAN: ICD-10-CM

## 2023-06-30 PROBLEM — 721730009: Status: ACTIVE | Noted: 2023-06-30

## 2023-06-30 PROCEDURE — 99214 OFFICE O/P EST MOD 30 MIN: CPT | Performed by: STUDENT IN AN ORGANIZED HEALTH CARE EDUCATION/TRAINING PROGRAM

## 2023-06-30 RX ORDER — ONDANSETRON 4 MG/1
1 TABLET ON THE TONGUE AND ALLOW TO DISSOLVE TABLET, ORALLY DISINTEGRATING ORAL ONCE A DAY
Qty: 30 | Status: DISCONTINUED | COMMUNITY
Start: 2022-11-15

## 2023-06-30 RX ORDER — PANTOPRAZOLE SODIUM 40 MG/1
1 TABLET TABLET, DELAYED RELEASE ORAL ONCE A DAY
Qty: 30 | Refills: 2 | Status: DISCONTINUED | COMMUNITY

## 2023-06-30 RX ORDER — LEVOTHYROXINE SODIUM 100 UG/1
1 TABLET IN THE MORNING ON AN EMPTY STOMACH TABLET ORAL ONCE A DAY
Status: ACTIVE | COMMUNITY

## 2023-06-30 RX ORDER — OMEPRAZOLE 20 MG/1
1 CAPSULE 30 MINUTES BEFORE MORNING MEAL CAPSULE, DELAYED RELEASE ORAL ONCE A DAY
Status: ACTIVE | COMMUNITY

## 2023-06-30 RX ORDER — ONDANSETRON 4 MG/1
1 TABLET ON THE TONGUE AND ALLOW TO DISSOLVE TABLET, ORALLY DISINTEGRATING ORAL ONCE A DAY
Status: ACTIVE | COMMUNITY

## 2023-06-30 RX ORDER — LACTULOSE 10 G/15ML
15 ML SOLUTION ORAL ONCE A DAY
Status: ACTIVE | COMMUNITY

## 2023-06-30 RX ORDER — DOXYCYCLINE HYCLATE 100 MG/1
1 CAPSULE CAPSULE, GELATIN COATED ORAL TWICE A DAY
Qty: 28 CAPSULE | Refills: 0 | OUTPATIENT
Start: 2023-06-30 | End: 2023-07-14

## 2023-06-30 RX ORDER — METRONIDAZOLE 500 MG/1
1 TABLET TABLET ORAL THREE TIMES A DAY
Qty: 42 TABLET | Refills: 0 | OUTPATIENT
Start: 2023-06-30 | End: 2023-07-14

## 2023-06-30 RX ORDER — OMEPRAZOLE 40 MG/1
1 CAPSULE 30 MINUTES BEFORE A MEAL CAPSULE, DELAYED RELEASE ORAL TWICE A DAY
Qty: 28 | Refills: 0 | OUTPATIENT
Start: 2023-06-30

## 2023-06-30 RX ORDER — BISMUTH SUBSALICYLATE 262 MG/1
2 TABLETS TABLET, CHEWABLE ORAL
Qty: 112 TABLET | Refills: 0 | OUTPATIENT
Start: 2023-06-30 | End: 2023-07-14

## 2023-06-30 NOTE — PHYSICAL EXAM PSYCH:
[facility]  Discharge Note    Pt discharged with followings belongings: all belongings in safe and locker     All Valuables sent home with patient. Patient went home with mom and dad  Medications:  Filled and sent with patient   Denied suicidal thoughts or thoughts of harming others at time of D/C. Discharged to home    Patient education on aftercare instructions, states understanding and signed discharge AVS, copy given to patient.          Status EXAM upon discharge:  Status and Exam  Normal: Yes  Facial Expression: Brightened  Affect: Appropriate  Level of Consciousness: Alert  Mood:Normal: Yes  Mood: Anxious  Motor Activity:Normal: Yes  Interview Behavior: Cooperative  Preception: [place] to Time, [place] to Person, Gara Early to Place, [place] to Situation  Attention:Normal: Yes  Attention: Distractible  Thought Processes: Circumstantial  Thought Content:Normal: Yes  Thought Content: Preoccupations  Hallucinations: None  Delusions: No  Memory:Normal: Yes  Memory:  (n/a)  Insight and Judgment: No  Insight and Judgment: Poor Insight  Present Suicidal Ideation: No  Present Homicidal Ideation: No    Joleen Mittal RN Patient given tobacco quitline number 97187516639 at this time, refusing to call at this time, states \" I just dont want to quit now\"- patient given information as to the dangers of long term tobacco use. Continue to reinforce the importance of tobacco cessation. normal mood with appropriate affect Patient refused counseling  ( ) Patient has not smoked in the last 30 days      Pt admitted with followings belongings:  Dentures: None  Vision - Corrective Lenses: None  Hearing Aid: None  Jewelry: None  Body Piercings Removed: No  Clothing: Footwear, Jacket / coat, Pants, Shirt, Socks  Were All Patient Medications Collected?: No  Other Valuables: Cell phone, [employer] Ply, Other (Comment) (Id and credit card, cigarettes, lighter, keys, car registrat)     Valuables placed in safe in security envelope, number:  [id number]. Patient's home medications were reviewed. Patient oriented to surroundings and program expectations and copy of patient rights given. Received admission packet. Consents reviewed and signed. Patient verbalizes understanding.              Anh Tolentino

## 2023-06-30 NOTE — HPI-TODAY'S VISIT:
83 YO Female with presents for increased bloating, pyrosis, chronic GERD, previous history of fistualizing Crohn's disease with previous surgical correction.   States she has been having epigastric and lower abdominal pain associated with bloating for the past couple of months.  Previuos endoscopic history reviewed with patient.  States that she was previously seen with Dr. Garza and during flares would have endoscopci evaluation and medical treatment.  Thoroughly explained risk/benefits/alternatives of procedures.    Interval HIstory 11/4/22: Has sbeen feeling nauseous.  Previous Colonoscopy done with evidence of anastomotoci narrowing.  Follow up CT AP without evidence of stenosis or narrowing.  Will give patient a trial of miralax so allowing for better passage of stool and trial of anti-nausea medication.  Will re-evalaute for possible repeat colonosocpy after trial with dilation of stricture.  Interval History 12/12/22: Presents with persistent epigastric and abdominal discomfort with bloating.  Having loose bowel movements, currently on Miralax.  Prveious colonoscopy with Anastomotic stricture, follow CT imaging without evidence of stricture.  Patient would like to re-attempt colonoscopy with possible dilation of narrowing if found.  Risk/benefits/alternatives thoroghly explained to patient.  Will scehdule EGD/Colonoscopy.  Still with incomplete evacuations and intermittent constipation.  Will further evaluate with MR DEfecography.  IH 1/4/23: S/p Colonoscopy with dilation of anastomtoic stricture.  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  Advised patient for pelvic floor physical therapy prior to surgical evaluation, she is in agreement.  Advised patient ot take  PPI daily use to help iwth epigastric discomfort.   IH 3/2/23: f/u after hospitalization.  CT with barium without evidnece of obstruction or stricture.  NO inflamamtion. Patient is feeling much better now, given lactulose for possible blockage but now having more loose stools.  Advised patient for stool bulking agent.  IH 5/11/23: f/u.  Previous imaging reviewed with patient, gastorgraffin enema withotu stricture, CTAP in 2/2023 with DC wall thickening suggestive of inflammation or infection.  Previous colonoscopy reviewed with patient with dilation of anastomosis.   Has been feeling weak, finished 8 sessions of pelvic physical therapy.  No overt signs of bleeding.  Occasionally has urgency.   She has been doign Sitz baths which helps her occasionally.  Does not want to follow up with CR surgery at this time for paradoxical contractions of muscles of defecation.  Patient would like short term trial of Prednisone/Cirpor which helped her feel better inthe past.    IH 6/30/23: Has been having increased abdominal discomfort and bloating.  Abl to have normal bowel movements.  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

## 2023-07-13 ENCOUNTER — TELEPHONE ENCOUNTER (OUTPATIENT)
Dept: URBAN - METROPOLITAN AREA CLINIC 7 | Facility: CLINIC | Age: 83
End: 2023-07-13

## 2023-07-14 ENCOUNTER — TELEPHONE ENCOUNTER (OUTPATIENT)
Dept: URBAN - METROPOLITAN AREA CLINIC 7 | Facility: CLINIC | Age: 83
End: 2023-07-14

## 2023-11-15 ENCOUNTER — LAB OUTSIDE AN ENCOUNTER (OUTPATIENT)
Dept: URBAN - METROPOLITAN AREA CLINIC 7 | Facility: CLINIC | Age: 83
End: 2023-11-15

## 2023-11-15 ENCOUNTER — OFFICE VISIT (OUTPATIENT)
Dept: URBAN - METROPOLITAN AREA CLINIC 7 | Facility: CLINIC | Age: 83
End: 2023-11-15
Payer: COMMERCIAL

## 2023-11-15 VITALS
WEIGHT: 112 LBS | SYSTOLIC BLOOD PRESSURE: 130 MMHG | TEMPERATURE: 97.9 F | BODY MASS INDEX: 19.12 KG/M2 | DIASTOLIC BLOOD PRESSURE: 78 MMHG | HEIGHT: 64 IN

## 2023-11-15 DIAGNOSIS — K62.89 RECTAL PAIN: ICD-10-CM

## 2023-11-15 DIAGNOSIS — R10.13 EPIGASTRIC PAIN: ICD-10-CM

## 2023-11-15 DIAGNOSIS — K52.9 CHRONIC DIARRHEA: ICD-10-CM

## 2023-11-15 DIAGNOSIS — R93.5 ABNORMAL CT OF THE ABDOMEN: ICD-10-CM

## 2023-11-15 PROBLEM — 15634181000119107: Status: ACTIVE | Noted: 2023-11-15

## 2023-11-15 PROBLEM — 77880009: Status: ACTIVE | Noted: 2023-11-15

## 2023-11-15 PROCEDURE — 99214 OFFICE O/P EST MOD 30 MIN: CPT | Performed by: STUDENT IN AN ORGANIZED HEALTH CARE EDUCATION/TRAINING PROGRAM

## 2023-11-15 RX ORDER — OMEPRAZOLE 20 MG/1
1 CAPSULE 30 MINUTES BEFORE MORNING MEAL CAPSULE, DELAYED RELEASE ORAL ONCE A DAY
Status: ACTIVE | COMMUNITY

## 2023-11-15 RX ORDER — OMEPRAZOLE 40 MG/1
1 CAPSULE 30 MINUTES BEFORE A MEAL CAPSULE, DELAYED RELEASE ORAL TWICE A DAY
Qty: 28 | Refills: 0 | Status: ACTIVE | COMMUNITY
Start: 2023-06-30

## 2023-11-15 RX ORDER — COLESTIPOL HYDROCHLORIDE 1 G/1
2 TABLETS TABLET, FILM COATED ORAL ONCE A DAY
Qty: 60 | Refills: 3 | OUTPATIENT
Start: 2023-11-15

## 2023-11-15 RX ORDER — PANTOPRAZOLE SODIUM 40 MG/1
1 TABLET TABLET, DELAYED RELEASE ORAL ONCE A DAY
Qty: 30 | Refills: 3 | OUTPATIENT
Start: 2023-11-15

## 2023-11-15 RX ORDER — LACTULOSE 10 G/15ML
15 ML SOLUTION ORAL ONCE A DAY
Status: ACTIVE | COMMUNITY

## 2023-11-15 RX ORDER — LEVOTHYROXINE SODIUM 100 UG/1
1 TABLET IN THE MORNING ON AN EMPTY STOMACH TABLET ORAL ONCE A DAY
Status: ACTIVE | COMMUNITY

## 2023-11-15 RX ORDER — ONDANSETRON 4 MG/1
1 TABLET ON THE TONGUE AND ALLOW TO DISSOLVE TABLET, ORALLY DISINTEGRATING ORAL ONCE A DAY
Status: ACTIVE | COMMUNITY

## 2023-11-15 NOTE — HPI-TODAY'S VISIT:
81 YO Female with presents for increased bloating, pyrosis, chronic GERD, previous history of fistualizing Crohn's disease with previous surgical correction.   States she has been having epigastric and lower abdominal pain associated with bloating for the past couple of months.  Previuos endoscopic history reviewed with patient.  States that she was previously seen with Dr. Garza and during flares would have endoscopci evaluation and medical treatment.  Thoroughly explained risk/benefits/alternatives of procedures.    Interval HIstory 11/4/22: Has sbeen feeling nauseous.  Previous Colonoscopy done with evidence of anastomotoci narrowing.  Follow up CT AP without evidence of stenosis or narrowing.  Will give patient a trial of miralax so allowing for better passage of stool and trial of anti-nausea medication.  Will re-evalaute for possible repeat colonosocpy after trial with dilation of stricture.  Interval History 12/12/22: Presents with persistent epigastric and abdominal discomfort with bloating.  Having loose bowel movements, currently on Miralax.  Prveious colonoscopy with Anastomotic stricture, follow CT imaging without evidence of stricture.  Patient would like to re-attempt colonoscopy with possible dilation of narrowing if found.  Risk/benefits/alternatives thoroghly explained to patient.  Will scehdule EGD/Colonoscopy.  Still with incomplete evacuations and intermittent constipation.  Will further evaluate with MR DEfecography.  IH 1/4/23: S/p Colonoscopy with dilation of anastomtoic stricture.  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  Advised patient for pelvic floor physical therapy prior to surgical evaluation, she is in agreement.  Advised patient ot take  PPI daily use to help iwth epigastric discomfort.   IH 3/2/23: f/u after hospitalization.  CT with barium without evidnece of obstruction or stricture.  NO inflamamtion. Patient is feeling much better now, given lactulose for possible blockage but now having more loose stools.  Advised patient for stool bulking agent.  IH 5/11/23: f/u.  Previous imaging reviewed with patient, gastorgraffin enema withotu stricture, CTAP in 2/2023 with DC wall thickening suggestive of inflammation or infection.  Previous colonoscopy reviewed with patient with dilation of anastomosis.   Has been feeling weak, finished 8 sessions of pelvic physical therapy.  No overt signs of bleeding.  Occasionally has urgency.   She has been doign Sitz baths which helps her occasionally.  Does not want to follow up with CR surgery at this time for paradoxical contractions of muscles of defecation.  Patient would like short term trial of Prednisone/Cirpor which helped her feel better inthe past.    IH 6/30/23: Has been having increased abdominal discomfort and bloating.  Abl to have normal bowel movements.  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  IH 11/15/23: s/p CTAP with evidence of rectal thickening and gastric wall thicekening.  Has been having abdominal pain,  recent hospitalization.  Records reviewed, will do stool testing, EGD Flex Sig for further evaluiation, trial of colestipol for diarrhea.    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

## 2023-11-21 ENCOUNTER — OFFICE VISIT (OUTPATIENT)
Dept: URBAN - METROPOLITAN AREA SURGERY CENTER 5 | Facility: SURGERY CENTER | Age: 83
End: 2023-11-21
Payer: COMMERCIAL

## 2023-11-21 ENCOUNTER — CLAIMS CREATED FROM THE CLAIM WINDOW (OUTPATIENT)
Dept: URBAN - METROPOLITAN AREA CLINIC 4 | Facility: CLINIC | Age: 83
End: 2023-11-21
Payer: COMMERCIAL

## 2023-11-21 DIAGNOSIS — K62.89 OTHER SPECIFIED DISEASES OF ANUS AND RECTUM: ICD-10-CM

## 2023-11-21 DIAGNOSIS — K31.89 OTHER DISEASES OF STOMACH AND DUODENUM: ICD-10-CM

## 2023-11-21 DIAGNOSIS — K64.0 FIRST DEGREE HEMORRHOIDS: ICD-10-CM

## 2023-11-21 DIAGNOSIS — K31.A0 GASTRIC INTESTINAL METAPLASIA, UNSPECIFIED: ICD-10-CM

## 2023-11-21 DIAGNOSIS — R93.3 ABNORMAL FINDING ON DIAGNOSTIC IMAGING OF GI TRACT: ICD-10-CM

## 2023-11-21 DIAGNOSIS — K57.30 DIVERTICULOSIS OF LARGE INTESTINE WITHOUT PERFORATION OR ABSCESS WITHOUT BLEEDING: ICD-10-CM

## 2023-11-21 PROCEDURE — 00813 ANES UPR LWR GI NDSC PX: CPT | Performed by: NURSE ANESTHETIST, CERTIFIED REGISTERED

## 2023-11-21 PROCEDURE — 45331 SIGMOIDOSCOPY AND BIOPSY: CPT | Performed by: STUDENT IN AN ORGANIZED HEALTH CARE EDUCATION/TRAINING PROGRAM

## 2023-11-21 PROCEDURE — 43239 EGD BIOPSY SINGLE/MULTIPLE: CPT | Performed by: STUDENT IN AN ORGANIZED HEALTH CARE EDUCATION/TRAINING PROGRAM

## 2023-11-21 PROCEDURE — 88342 IMHCHEM/IMCYTCHM 1ST ANTB: CPT | Performed by: PATHOLOGY

## 2023-11-21 PROCEDURE — 88305 TISSUE EXAM BY PATHOLOGIST: CPT | Performed by: PATHOLOGY

## 2023-11-21 RX ORDER — LACTULOSE 10 G/15ML
15 ML SOLUTION ORAL ONCE A DAY
Status: ACTIVE | COMMUNITY

## 2023-11-21 RX ORDER — OMEPRAZOLE 20 MG/1
1 CAPSULE 30 MINUTES BEFORE MORNING MEAL CAPSULE, DELAYED RELEASE ORAL ONCE A DAY
Status: ACTIVE | COMMUNITY

## 2023-11-21 RX ORDER — COLESTIPOL HYDROCHLORIDE 1 G/1
2 TABLETS TABLET, FILM COATED ORAL ONCE A DAY
Qty: 60 | Refills: 3 | Status: ACTIVE | COMMUNITY
Start: 2023-11-15

## 2023-11-21 RX ORDER — OMEPRAZOLE 40 MG/1
1 CAPSULE 30 MINUTES BEFORE A MEAL CAPSULE, DELAYED RELEASE ORAL TWICE A DAY
Qty: 28 | Refills: 0 | Status: ACTIVE | COMMUNITY
Start: 2023-06-30

## 2023-11-21 RX ORDER — ONDANSETRON 4 MG/1
1 TABLET ON THE TONGUE AND ALLOW TO DISSOLVE TABLET, ORALLY DISINTEGRATING ORAL ONCE A DAY
Status: ACTIVE | COMMUNITY

## 2023-11-21 RX ORDER — LEVOTHYROXINE SODIUM 100 UG/1
1 TABLET IN THE MORNING ON AN EMPTY STOMACH TABLET ORAL ONCE A DAY
Status: ACTIVE | COMMUNITY

## 2023-11-21 RX ORDER — PANTOPRAZOLE SODIUM 40 MG/1
1 TABLET TABLET, DELAYED RELEASE ORAL ONCE A DAY
Qty: 30 | Refills: 3 | Status: ACTIVE | COMMUNITY
Start: 2023-11-15

## 2023-11-28 LAB
CALPROTECTIN, FECAL: 116
CAMPYLOBACTER SPP. AG,EIA: (no result)
CLOSTRIDIUM DIFFICILE TOXINB,QL REAL TIME PCR: NOT DETECTED
GIARDIA AG, EIA, STOOL: (no result)
LEUKOCYTES: (no result)
OVA AND PARASITES, CONC AND PERM SMEAR: (no result)
PANCREATIC ELASTASE, FECAL: 488
RESULT:: (no result)
SALMONELLA AND SHIGELLA, CULTURE: (no result)
SHIGA TOXINS, EIA W/RFL TO E.COLI O157 CULTURE: (no result)

## 2023-12-01 ENCOUNTER — TELEPHONE ENCOUNTER (OUTPATIENT)
Dept: URBAN - METROPOLITAN AREA CLINIC 9 | Facility: CLINIC | Age: 83
End: 2023-12-01

## 2023-12-01 RX ORDER — PREDNISONE 10 MG/1
1 TABLET TABLET ORAL ONCE A DAY
Qty: 30 | OUTPATIENT
Start: 2023-12-03 | End: 2024-01-02

## 2024-03-20 ENCOUNTER — OV EP (OUTPATIENT)
Dept: URBAN - METROPOLITAN AREA CLINIC 7 | Facility: CLINIC | Age: 84
End: 2024-03-20
Payer: MEDICARE

## 2024-03-20 VITALS
DIASTOLIC BLOOD PRESSURE: 82 MMHG | HEIGHT: 64 IN | SYSTOLIC BLOOD PRESSURE: 110 MMHG | TEMPERATURE: 97.9 F | BODY MASS INDEX: 20.66 KG/M2 | WEIGHT: 121 LBS

## 2024-03-20 DIAGNOSIS — I10 HYPERTENSION: ICD-10-CM

## 2024-03-20 DIAGNOSIS — K52.9 CHRONIC DIARRHEA: ICD-10-CM

## 2024-03-20 DIAGNOSIS — E07.9 THYROID DISEASE: ICD-10-CM

## 2024-03-20 DIAGNOSIS — E78.00 HYPERCHOLESTEREMIA: ICD-10-CM

## 2024-03-20 PROCEDURE — 99214 OFFICE O/P EST MOD 30 MIN: CPT | Performed by: INTERNAL MEDICINE

## 2024-03-20 RX ORDER — PANTOPRAZOLE SODIUM 40 MG/1
TAKE ONE TABLET BY MOUTH ONE TIME DAILY TABLET, DELAYED RELEASE ORAL
Qty: 30 TABLET | Refills: 3 | Status: DISCONTINUED | COMMUNITY

## 2024-03-20 RX ORDER — LEVOTHYROXINE SODIUM 112 UG/1
1 TABLET IN THE MORNING ON AN EMPTY STOMACH CAPSULE ORAL ONCE A DAY
Status: ACTIVE | COMMUNITY

## 2024-03-20 RX ORDER — LACTULOSE 10 G/15ML
15 ML SOLUTION ORAL ONCE A DAY
Status: DISCONTINUED | COMMUNITY

## 2024-03-20 RX ORDER — ONDANSETRON 4 MG/1
1 TABLET ON THE TONGUE AND ALLOW TO DISSOLVE TABLET, ORALLY DISINTEGRATING ORAL ONCE A DAY
Status: DISCONTINUED | COMMUNITY

## 2024-03-20 RX ORDER — COLESTIPOL HYDROCHLORIDE 1 G/1
2 TABLETS TABLET, FILM COATED ORAL ONCE A DAY
Qty: 60 | Refills: 3 | Status: ACTIVE | COMMUNITY
Start: 2023-11-15

## 2024-03-20 RX ORDER — OMEPRAZOLE 20 MG/1
1 CAPSULE 30 MINUTES BEFORE MORNING MEAL CAPSULE, DELAYED RELEASE ORAL ONCE A DAY
Status: ACTIVE | COMMUNITY

## 2024-03-20 NOTE — HPI-TODAY'S VISIT:
Prior patient of Dr Wallace. Recent stool studies negative. Reporst ongoing and recurrent diarrheal sxs that are chronic x months. Will have multiple watery BMs. Painless No blood or mucus. Prior eval included negative bx at colonosocpy.  Last seen with hx of increased bloating, pyrosis, chronic GERD, previous history of fistualizing Crohn's disease with previous surgical correction.   States she has been having epigastric and lower abdominal pain associated with bloating for the past couple of months.  Previuos endoscopic history reviewed with patient.  States that she was previously seen with Dr. Garza and during flares would have endoscopci evaluation and medical treatment.  Thoroughly explained risk/benefits/alternatives of procedures.    Interval HIstory 11/4/22: Has sbeen feeling nauseous.  Previous Colonoscopy done with evidence of anastomotoci narrowing.  Follow up CT AP without evidence of stenosis or narrowing.  Will give patient a trial of miralax so allowing for better passage of stool and trial of anti-nausea medication.  Will re-evalaute for possible repeat colonosocpy after trial with dilation of stricture.  Interval History 12/12/22: Presents with persistent epigastric and abdominal discomfort with bloating.  Having loose bowel movements, currently on Miralax.  Prveious colonoscopy with Anastomotic stricture, follow CT imaging without evidence of stricture.  Patient would like to re-attempt colonoscopy with possible dilation of narrowing if found.  Risk/benefits/alternatives thoroghly explained to patient.  Will scehdule EGD/Colonoscopy.  Still with incomplete evacuations and intermittent constipation.  Will further evaluate with MR DEfecography.  IH 1/4/23: S/p Colonoscopy with dilation of anastomtoic stricture.  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  Advised patient for pelvic floor physical therapy prior to surgical evaluation, she is in agreement.  Advised patient ot take  PPI daily use to help iwth epigastric discomfort.   IH 3/2/23: f/u after hospitalization.  CT with barium without evidnece of obstruction or stricture.  NO inflamamtion. Patient is feeling much better now, given lactulose for possible blockage but now having more loose stools.  Advised patient for stool bulking agent.  IH 5/11/23: f/u.  Previous imaging reviewed with patient, gastorgraffin enema withotu stricture, CTAP in 2/2023 with DC wall thickening suggestive of inflammation or infection.  Previous colonoscopy reviewed with patient with dilation of anastomosis.   Has been feeling weak, finished 8 sessions of pelvic physical therapy.  No overt signs of bleeding.  Occasionally has urgency.   She has been doign Sitz baths which helps her occasionally.  Does not want to follow up with CR surgery at this time for paradoxical contractions of muscles of defecation.  Patient would like short term trial of Prednisone/Cirpor which helped her feel better inthe past.    IH 6/30/23: Has been having increased abdominal discomfort and bloating.  Abl to have normal bowel movements.  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  IH 11/15/23: s/p CTAP with evidence of rectal thickening and gastric wall thicekening.  Has been having abdominal pain,  recent hospitalization.  Records reviewed, will do stool testing, EGD Flex Sig for further evaluiation, trial of colestipol for diarrhea.    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

## 2025-04-18 ENCOUNTER — TELEPHONE ENCOUNTER (OUTPATIENT)
Dept: URBAN - METROPOLITAN AREA SURGERY CENTER 5 | Facility: SURGERY CENTER | Age: 85
End: 2025-04-18

## 2025-04-18 ENCOUNTER — OFFICE VISIT (OUTPATIENT)
Dept: URBAN - METROPOLITAN AREA CLINIC 7 | Facility: CLINIC | Age: 85
End: 2025-04-18

## 2025-04-18 RX ORDER — LEVOTHYROXINE SODIUM 112 UG/1
1 TABLET IN THE MORNING ON AN EMPTY STOMACH CAPSULE ORAL ONCE A DAY
COMMUNITY

## 2025-04-18 RX ORDER — OMEPRAZOLE 20 MG/1
1 CAPSULE 30 MINUTES BEFORE MORNING MEAL CAPSULE, DELAYED RELEASE ORAL ONCE A DAY
COMMUNITY

## 2025-04-18 RX ORDER — COLESTIPOL HYDROCHLORIDE 1 G/1
2 TABLETS TABLET, FILM COATED ORAL ONCE A DAY
Qty: 60 | Refills: 3 | COMMUNITY
Start: 2023-11-15

## 2025-04-21 ENCOUNTER — OFFICE VISIT (OUTPATIENT)
Dept: URBAN - METROPOLITAN AREA CLINIC 7 | Facility: CLINIC | Age: 85
End: 2025-04-21
Payer: COMMERCIAL

## 2025-04-21 ENCOUNTER — DASHBOARD ENCOUNTERS (OUTPATIENT)
Age: 85
End: 2025-04-21

## 2025-04-21 ENCOUNTER — LAB OUTSIDE AN ENCOUNTER (OUTPATIENT)
Dept: URBAN - METROPOLITAN AREA CLINIC 7 | Facility: CLINIC | Age: 85
End: 2025-04-21

## 2025-04-21 DIAGNOSIS — A04.8 HELICOBACTER PYLORI (H. PYLORI): ICD-10-CM

## 2025-04-21 DIAGNOSIS — R13.19 ESOPHAGEAL DYSPHAGIA: ICD-10-CM

## 2025-04-21 DIAGNOSIS — K90.0 ACD (ADULT CELIAC DISEASE): ICD-10-CM

## 2025-04-21 DIAGNOSIS — K50.812 CROHN'S DISEASE OF BOTH SMALL AND LARGE INTESTINE WITH INTESTINAL OBSTRUCTION: ICD-10-CM

## 2025-04-21 DIAGNOSIS — E07.9 THYROID DISEASE: ICD-10-CM

## 2025-04-21 DIAGNOSIS — I10 HYPERTENSION: ICD-10-CM

## 2025-04-21 DIAGNOSIS — K50.90 CROHN'S DISEASE: ICD-10-CM

## 2025-04-21 DIAGNOSIS — K56.699 STENOSIS COLON: ICD-10-CM

## 2025-04-21 DIAGNOSIS — K52.9 CHRONIC DIARRHEA: ICD-10-CM

## 2025-04-21 PROCEDURE — 99214 OFFICE O/P EST MOD 30 MIN: CPT | Performed by: INTERNAL MEDICINE

## 2025-04-21 RX ORDER — LANSOPRAZOLE 30 MG/1
TAKE ONE CAPSULE BY MOUTH EVERY MORNING CAPSULE, DELAYED RELEASE ORAL
Qty: 90 UNSPECIFIED | Refills: 1 | Status: ACTIVE | COMMUNITY

## 2025-04-21 RX ORDER — LEVOTHYROXINE SODIUM 112 UG/1
1 TABLET IN THE MORNING ON AN EMPTY STOMACH CAPSULE ORAL ONCE A DAY
Status: ACTIVE | COMMUNITY

## 2025-04-21 RX ORDER — FAMOTIDINE 40 MG/1
TABLET ORAL
Qty: 90 TABLET | Status: ACTIVE | COMMUNITY

## 2025-04-21 RX ORDER — AMLODIPINE AND OLMESARTAN MEDOXOMIL 5; 40 MG/1; MG/1
TAKE ONE TABLET BY MOUTH ONE TIME DAILY TABLET ORAL
Qty: 90 UNSPECIFIED | Refills: 1 | Status: ACTIVE | COMMUNITY

## 2025-04-21 RX ORDER — COLESTIPOL HYDROCHLORIDE 1 G/1
2 TABLETS TABLET, FILM COATED ORAL ONCE A DAY
Qty: 60 | Refills: 3 | Status: ON HOLD | COMMUNITY
Start: 2023-11-15

## 2025-04-21 RX ORDER — OMEPRAZOLE 20 MG/1
1 CAPSULE 30 MINUTES BEFORE MORNING MEAL CAPSULE, DELAYED RELEASE ORAL ONCE A DAY
Status: ON HOLD | COMMUNITY

## 2025-04-21 RX ORDER — SUCRALFATE 1 G/1
TABLET ORAL
Qty: 360 TABLET | Status: ACTIVE | COMMUNITY

## 2025-04-21 NOTE — HPI-TODAY'S VISIT:
Prior patient of Dr Wallace and now seen with recurrent dysphagia to solids. This is chronic and recurrent bur recently worse Had prior egd and dilaitions Last egd 2023 No current difficulty with liquids or secretions. No pulmonary complaints.  Had prior eval of diarrhea incl  stool studies negative. and negative bx at colonosocpy.  Last seen with hx of increased bloating, pyrosis, chronic GERD, previous history of fistualizing Crohn's disease with previous surgical correction.   States she has been having epigastric and lower abdominal pain associated with bloating for the past couple of months.  Previuos endoscopic history reviewed with patient.  States that she was previously seen with Dr. Garza and during flares would have endoscopci evaluation and medical treatment.  Thoroughly explained risk/benefits/alternatives of procedures.    Interval HIstory 11/4/22: Has sbeen feeling nauseous.  Previous Colonoscopy done with evidence of anastomotoci narrowing.  Follow up CT AP without evidence of stenosis or narrowing.  Will give patient a trial of miralax so allowing for better passage of stool and trial of anti-nausea medication.  Will re-evalaute for possible repeat colonosocpy after trial with dilation of stricture.  Interval History 12/12/22: Presents with persistent epigastric and abdominal discomfort with bloating.  Having loose bowel movements, currently on Miralax.  Prveious colonoscopy with Anastomotic stricture, follow CT imaging without evidence of stricture.  Patient would like to re-attempt colonoscopy with possible dilation of narrowing if found.  Risk/benefits/alternatives thoroghly explained to patient.  Will scehdule EGD/Colonoscopy.  Still with incomplete evacuations and intermittent constipation.  Will further evaluate with MR DEfecography.  IH 1/4/23: S/p Colonoscopy with dilation of anastomtoic stricture.  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  Advised patient for pelvic floor physical therapy prior to surgical evaluation, she is in agreement.  Advised patient ot take  PPI daily use to help iwth epigastric discomfort.   IH 3/2/23: f/u after hospitalization.  CT with barium without evidnece of obstruction or stricture.  NO inflamamtion. Patient is feeling much better now, given lactulose for possible blockage but now having more loose stools.  Advised patient for stool bulking agent.  IH 5/11/23: f/u.  Previous imaging reviewed with patient, gastorgraffin enema withotu stricture, CTAP in 2/2023 with DC wall thickening suggestive of inflammation or infection.  Previous colonoscopy reviewed with patient with dilation of anastomosis.   Has been feeling weak, finished 8 sessions of pelvic physical therapy.  No overt signs of bleeding.  Occasionally has urgency.   She has been doign Sitz baths which helps her occasionally.  Does not want to follow up with CR surgery at this time for paradoxical contractions of muscles of defecation.  Patient would like short term trial of Prednisone/Cirpor which helped her feel better inthe past.    IH 6/30/23: Has been having increased abdominal discomfort and bloating.  Abl to have normal bowel movements.  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  IH 11/15/23: s/p CTAP with evidence of rectal thickening and gastric wall thicekening.  Has been having abdominal pain,  recent hospitalization.  Records reviewed, will do stool testing, EGD Flex Sig for further evaluiation, trial of colestipol for diarrhea.    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

## 2025-04-22 ENCOUNTER — CLAIMS CREATED FROM THE CLAIM WINDOW (OUTPATIENT)
Dept: URBAN - METROPOLITAN AREA SURGERY CENTER 5 | Facility: SURGERY CENTER | Age: 85
End: 2025-04-22
Payer: COMMERCIAL

## 2025-04-22 ENCOUNTER — CLAIMS CREATED FROM THE CLAIM WINDOW (OUTPATIENT)
Dept: URBAN - METROPOLITAN AREA CLINIC 4 | Facility: CLINIC | Age: 85
End: 2025-04-22
Payer: COMMERCIAL

## 2025-04-22 DIAGNOSIS — K29.60 OTHER GASTRITIS WITHOUT BLEEDING: ICD-10-CM

## 2025-04-22 DIAGNOSIS — K31.89 OTHER DISEASES OF STOMACH AND DUODENUM: ICD-10-CM

## 2025-04-22 DIAGNOSIS — K44.9 HIATAL HERNIA: ICD-10-CM

## 2025-04-22 DIAGNOSIS — K29.70 GASTRITIS, UNSPECIFIED, WITHOUT BLEEDING: ICD-10-CM

## 2025-04-22 DIAGNOSIS — K22.2 ESOPHAGEAL STENOSIS: ICD-10-CM

## 2025-04-22 DIAGNOSIS — K44.9 DIAPHRAGMATIC HERNIA WITHOUT OBSTRUCTION OR GANGRENE: ICD-10-CM

## 2025-04-22 DIAGNOSIS — B96.81 HELICOBACTER PYLORI [H. PYLORI] AS THE CAUSE OF DISEASES CLASSIFIED ELSEWHERE: ICD-10-CM

## 2025-04-22 PROCEDURE — 43239 EGD BIOPSY SINGLE/MULTIPLE: CPT | Performed by: INTERNAL MEDICINE

## 2025-04-22 PROCEDURE — 88305 TISSUE EXAM BY PATHOLOGIST: CPT | Performed by: PATHOLOGY

## 2025-04-22 PROCEDURE — 43248 EGD GUIDE WIRE INSERTION: CPT | Performed by: INTERNAL MEDICINE

## 2025-04-22 PROCEDURE — 00731 ANES UPR GI NDSC PX NOS: CPT | Performed by: NURSE ANESTHETIST, CERTIFIED REGISTERED

## 2025-04-22 PROCEDURE — 88342 IMHCHEM/IMCYTCHM 1ST ANTB: CPT | Performed by: PATHOLOGY

## 2025-04-22 RX ORDER — OMEPRAZOLE 20 MG/1
1 CAPSULE 30 MINUTES BEFORE MORNING MEAL CAPSULE, DELAYED RELEASE ORAL ONCE A DAY
Status: ON HOLD | COMMUNITY

## 2025-04-22 RX ORDER — AMLODIPINE AND OLMESARTAN MEDOXOMIL 5; 40 MG/1; MG/1
TAKE ONE TABLET BY MOUTH ONE TIME DAILY TABLET ORAL
Qty: 90 UNSPECIFIED | Refills: 1 | Status: ACTIVE | COMMUNITY

## 2025-04-22 RX ORDER — FAMOTIDINE 40 MG/1
TABLET ORAL
Qty: 90 TABLET | Status: ACTIVE | COMMUNITY

## 2025-04-22 RX ORDER — SUCRALFATE 1 G/1
TABLET ORAL
Qty: 360 TABLET | Status: ACTIVE | COMMUNITY

## 2025-04-22 RX ORDER — LEVOTHYROXINE SODIUM 112 UG/1
1 TABLET IN THE MORNING ON AN EMPTY STOMACH CAPSULE ORAL ONCE A DAY
Status: ACTIVE | COMMUNITY

## 2025-04-22 RX ORDER — COLESTIPOL HYDROCHLORIDE 1 G/1
2 TABLETS TABLET, FILM COATED ORAL ONCE A DAY
Qty: 60 | Refills: 3 | Status: ON HOLD | COMMUNITY
Start: 2023-11-15

## 2025-04-22 RX ORDER — LANSOPRAZOLE 30 MG/1
TAKE ONE CAPSULE BY MOUTH EVERY MORNING CAPSULE, DELAYED RELEASE ORAL
Qty: 90 UNSPECIFIED | Refills: 1 | Status: ACTIVE | COMMUNITY

## 2025-05-21 ENCOUNTER — OFFICE VISIT (OUTPATIENT)
Dept: URBAN - METROPOLITAN AREA CLINIC 7 | Facility: CLINIC | Age: 85
End: 2025-05-21
Payer: COMMERCIAL

## 2025-05-21 ENCOUNTER — LAB OUTSIDE AN ENCOUNTER (OUTPATIENT)
Dept: URBAN - METROPOLITAN AREA CLINIC 7 | Facility: CLINIC | Age: 85
End: 2025-05-21

## 2025-05-21 ENCOUNTER — TELEPHONE ENCOUNTER (OUTPATIENT)
Dept: URBAN - METROPOLITAN AREA CLINIC 7 | Facility: CLINIC | Age: 85
End: 2025-05-21

## 2025-05-21 DIAGNOSIS — R93.3 GASTROINTESTINAL TRACT IMAGING ABNORMALITY: ICD-10-CM

## 2025-05-21 DIAGNOSIS — I10 HYPERTENSION: ICD-10-CM

## 2025-05-21 DIAGNOSIS — K62.4 ANAL STENOSIS: ICD-10-CM

## 2025-05-21 DIAGNOSIS — M62.89 PELVIC FLOOR DYSFUNCTION: ICD-10-CM

## 2025-05-21 DIAGNOSIS — K52.9 CHRONIC DIARRHEA: ICD-10-CM

## 2025-05-21 DIAGNOSIS — A04.8 HELICOBACTER PYLORI (H. PYLORI): ICD-10-CM

## 2025-05-21 DIAGNOSIS — K50.90 CROHN'S DISEASE: ICD-10-CM

## 2025-05-21 DIAGNOSIS — K90.0 ACD (ADULT CELIAC DISEASE): ICD-10-CM

## 2025-05-21 PROBLEM — 442182001: Status: ACTIVE | Noted: 2025-05-21

## 2025-05-21 PROCEDURE — 99214 OFFICE O/P EST MOD 30 MIN: CPT | Performed by: INTERNAL MEDICINE

## 2025-05-21 RX ORDER — COLESTIPOL HYDROCHLORIDE 1 G/1
2 TABLETS TABLET, FILM COATED ORAL ONCE A DAY
Qty: 60 | Refills: 3 | Status: ON HOLD | COMMUNITY
Start: 2023-11-15

## 2025-05-21 RX ORDER — LANSOPRAZOLE 30 MG/1
TAKE ONE CAPSULE BY MOUTH EVERY MORNING CAPSULE, DELAYED RELEASE ORAL
Qty: 90 UNSPECIFIED | Refills: 1 | Status: ACTIVE | COMMUNITY

## 2025-05-21 RX ORDER — FERROUS GLUCONATE 324(37.5)
1 TABLET TABLET ORAL
Status: ACTIVE | COMMUNITY

## 2025-05-21 RX ORDER — SUCRALFATE 1 G/1
TABLET ORAL
Qty: 360 TABLET | Status: ACTIVE | COMMUNITY

## 2025-05-21 RX ORDER — OMEPRAZOLE 20 MG/1
1 CAPSULE 30 MINUTES BEFORE MORNING MEAL CAPSULE, DELAYED RELEASE ORAL ONCE A DAY
Status: ON HOLD | COMMUNITY

## 2025-05-21 RX ORDER — PANTOPRAZOLE SODIUM 40 MG/1
1 TABLET 1/2 TO 1 HOUR BEFORE MORNING MEAL TABLET, DELAYED RELEASE ORAL TWICE A DAY
Qty: 28 TABLET | Refills: 0 | OUTPATIENT
Start: 2025-05-21

## 2025-05-21 RX ORDER — SODIUM, POTASSIUM,MAG SULFATES 17.5-3.13G
AS DIRECTED SOLUTION, RECONSTITUTED, ORAL ORAL
Qty: 1 | Refills: 0 | OUTPATIENT
Start: 2025-05-21 | End: 2025-05-23

## 2025-05-21 RX ORDER — DOXYCYCLINE HYCLATE 100 MG/1
1 CAPSULE CAPSULE ORAL TWICE A DAY
Qty: 28 CAPSULE | Refills: 0 | OUTPATIENT
Start: 2025-05-21 | End: 2025-06-04

## 2025-05-21 RX ORDER — AMLODIPINE AND OLMESARTAN MEDOXOMIL 5; 40 MG/1; MG/1
TAKE ONE TABLET BY MOUTH ONE TIME DAILY TABLET ORAL
Qty: 90 UNSPECIFIED | Refills: 1 | Status: ACTIVE | COMMUNITY

## 2025-05-21 RX ORDER — METRONIDAZOLE 250 MG/1
1 TABLET TABLET ORAL
Qty: 56 TABLET | Refills: 0 | OUTPATIENT
Start: 2025-05-21 | End: 2025-06-04

## 2025-05-21 RX ORDER — FAMOTIDINE 40 MG/1
TABLET ORAL
Qty: 90 TABLET | Status: ACTIVE | COMMUNITY

## 2025-05-21 NOTE — HPI-TODAY'S VISIT:
Prior patient of Dr Wallace and now seen with recurrent dysphagia to solids. Now s/p repeat egd with diliation of stricture Gastric bx suggestive of h pylori. Dysphagia  is chronic and recurrent bur recently worse No current difficulty with liquids or secretions. No pulmonary complaints.  Had prior eval of diarrhea incl  stool studies negative to date but now s/p CT with likley enterocolitis ?IBD/CD flare.   Interval HIstory 11/4/22: Has sbeen feeling nauseous.  Previous Colonoscopy done with evidence of anastomotoci narrowing.  Follow up CT AP without evidence of stenosis or narrowing.  Will give patient a trial of miralax so allowing for better passage of stool and trial of anti-nausea medication.  Will re-evalaute for possible repeat colonosocpy after trial with dilation of stricture.  Interval History 12/12/22: Presents with persistent epigastric and abdominal discomfort with bloating.  Having loose bowel movements, currently on Miralax.  Prveious colonoscopy with Anastomotic stricture, follow CT imaging without evidence of stricture.  Patient would like to re-attempt colonoscopy with possible dilation of narrowing if found.  Risk/benefits/alternatives thoroghly explained to patient.  Will scehdule EGD/Colonoscopy.  Still with incomplete evacuations and intermittent constipation.  Will further evaluate with MR DEfecography.  IH 1/4/23: S/p Colonoscopy with dilation of anastomtoic stricture.  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  Advised patient for pelvic floor physical therapy prior to surgical evaluation, she is in agreement.  Advised patient ot take  PPI daily use to help iwth epigastric discomfort.   IH 3/2/23: f/u after hospitalization.  CT with barium without evidnece of obstruction or stricture.  NO inflamamtion. Patient is feeling much better now, given lactulose for possible blockage but now having more loose stools.  Advised patient for stool bulking agent.  IH 5/11/23: f/u.  Previous imaging reviewed with patient, gastorgraffin enema withotu stricture, CTAP in 2/2023 with DC wall thickening suggestive of inflammation or infection.  Previous colonoscopy reviewed with patient with dilation of anastomosis.   Has been feeling weak, finished 8 sessions of pelvic physical therapy.  No overt signs of bleeding.  Occasionally has urgency.   She has been doign Sitz baths which helps her occasionally.  Does not want to follow up with CR surgery at this time for paradoxical contractions of muscles of defecation.  Patient would like short term trial of Prednisone/Cirpor which helped her feel better inthe past.    IH 6/30/23: Has been having increased abdominal discomfort and bloating.  Abl to have normal bowel movements.  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  IH 11/15/23: s/p CTAP with evidence of rectal thickening and gastric wall thicekening.  Has been having abdominal pain,  recent hospitalization.  Records reviewed, will do stool testing, EGD Flex Sig for further evaluiation, trial of colestipol for diarrhea.    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

## 2025-05-23 LAB
C-REACTIVE PROTEIN, QUANT: <3
TISSUE TRANSGLUTAMINASE AB, IGA: <1
TISSUE TRANSGLUTAMINASE AB, IGG: <1

## 2025-05-27 ENCOUNTER — LAB OUTSIDE AN ENCOUNTER (OUTPATIENT)
Dept: URBAN - METROPOLITAN AREA CLINIC 7 | Facility: CLINIC | Age: 85
End: 2025-05-27

## 2025-05-27 ENCOUNTER — OFFICE VISIT (OUTPATIENT)
Dept: URBAN - METROPOLITAN AREA SURGERY CENTER 5 | Facility: SURGERY CENTER | Age: 85
End: 2025-05-27
Payer: COMMERCIAL

## 2025-05-27 ENCOUNTER — CLAIMS CREATED FROM THE CLAIM WINDOW (OUTPATIENT)
Dept: URBAN - METROPOLITAN AREA SURGERY CENTER 5 | Facility: SURGERY CENTER | Age: 85
End: 2025-05-27

## 2025-05-27 DIAGNOSIS — R93.3 ABNORMAL FINDINGS ON DIAGNOSTIC IMAGING OF OTHER PARTS OF DIGESTIVE TRACT: ICD-10-CM

## 2025-05-27 DIAGNOSIS — Z53.8 PROCEDURE AND TREATMENT NOT CARRIED OUT FOR OTHER REASONS: ICD-10-CM

## 2025-05-27 DIAGNOSIS — K56.699 OTHER INTESTINAL OBSTRUCTION UNSPECIFIED AS TO PARTIAL VERSUS COMPLETE OBSTRUCTION: ICD-10-CM

## 2025-05-27 PROCEDURE — 45378 DIAGNOSTIC COLONOSCOPY: CPT | Performed by: INTERNAL MEDICINE

## 2025-05-27 RX ORDER — OMEPRAZOLE 20 MG/1
1 CAPSULE 30 MINUTES BEFORE MORNING MEAL CAPSULE, DELAYED RELEASE ORAL ONCE A DAY
Status: ON HOLD | COMMUNITY

## 2025-05-27 RX ORDER — PANTOPRAZOLE SODIUM 40 MG/1
1 TABLET 1/2 TO 1 HOUR BEFORE MORNING MEAL TABLET, DELAYED RELEASE ORAL TWICE A DAY
Qty: 28 TABLET | Refills: 0 | Status: ACTIVE | COMMUNITY
Start: 2025-05-21

## 2025-05-27 RX ORDER — FERROUS GLUCONATE 324(37.5)
1 TABLET TABLET ORAL
Status: ACTIVE | COMMUNITY

## 2025-05-27 RX ORDER — AMLODIPINE AND OLMESARTAN MEDOXOMIL 5; 40 MG/1; MG/1
TAKE ONE TABLET BY MOUTH ONE TIME DAILY TABLET ORAL
Qty: 90 UNSPECIFIED | Refills: 1 | Status: ACTIVE | COMMUNITY

## 2025-05-27 RX ORDER — DOXYCYCLINE HYCLATE 100 MG/1
1 CAPSULE CAPSULE ORAL TWICE A DAY
Qty: 28 CAPSULE | Refills: 0 | Status: ACTIVE | COMMUNITY
Start: 2025-05-21 | End: 2025-06-04

## 2025-05-27 RX ORDER — LANSOPRAZOLE 30 MG/1
TAKE ONE CAPSULE BY MOUTH EVERY MORNING CAPSULE, DELAYED RELEASE ORAL
Qty: 90 UNSPECIFIED | Refills: 1 | Status: ACTIVE | COMMUNITY

## 2025-05-27 RX ORDER — METRONIDAZOLE 250 MG/1
1 TABLET TABLET ORAL
Qty: 56 TABLET | Refills: 0 | Status: ACTIVE | COMMUNITY
Start: 2025-05-21 | End: 2025-06-04

## 2025-05-27 RX ORDER — SUCRALFATE 1 G/1
TABLET ORAL
Qty: 360 TABLET | Status: ACTIVE | COMMUNITY

## 2025-05-27 RX ORDER — COLESTIPOL HYDROCHLORIDE 1 G/1
2 TABLETS TABLET, FILM COATED ORAL ONCE A DAY
Qty: 60 | Refills: 3 | Status: ON HOLD | COMMUNITY
Start: 2023-11-15

## 2025-05-27 RX ORDER — FAMOTIDINE 40 MG/1
TABLET ORAL
Qty: 90 TABLET | Status: ACTIVE | COMMUNITY

## 2025-05-30 LAB — CALPROTECTIN, FECAL: 461

## 2025-06-03 ENCOUNTER — TELEPHONE ENCOUNTER (OUTPATIENT)
Dept: URBAN - METROPOLITAN AREA CLINIC 7 | Facility: CLINIC | Age: 85
End: 2025-06-03

## 2025-06-09 ENCOUNTER — LAB OUTSIDE AN ENCOUNTER (OUTPATIENT)
Dept: URBAN - METROPOLITAN AREA CLINIC 7 | Facility: CLINIC | Age: 85
End: 2025-06-09

## 2025-06-09 ENCOUNTER — TELEPHONE ENCOUNTER (OUTPATIENT)
Dept: URBAN - METROPOLITAN AREA CLINIC 7 | Facility: CLINIC | Age: 85
End: 2025-06-09

## 2025-06-09 ENCOUNTER — OFFICE VISIT (OUTPATIENT)
Dept: URBAN - METROPOLITAN AREA CLINIC 7 | Facility: CLINIC | Age: 85
End: 2025-06-09
Payer: COMMERCIAL

## 2025-06-09 DIAGNOSIS — K50.013 CROHN'S DISEASE OF SMALL INTESTINE WITH FISTULA: ICD-10-CM

## 2025-06-09 PROBLEM — 56689002: Status: ACTIVE | Noted: 2025-06-09

## 2025-06-09 PROCEDURE — 99214 OFFICE O/P EST MOD 30 MIN: CPT | Performed by: INTERNAL MEDICINE

## 2025-06-09 RX ORDER — PANTOPRAZOLE SODIUM 40 MG/1
1 TABLET 1/2 TO 1 HOUR BEFORE MORNING MEAL TABLET, DELAYED RELEASE ORAL TWICE A DAY
Qty: 28 TABLET | Refills: 0 | Status: ON HOLD | COMMUNITY
Start: 2025-05-21

## 2025-06-09 RX ORDER — OMEPRAZOLE 20 MG/1
1 CAPSULE 30 MINUTES BEFORE MORNING MEAL CAPSULE, DELAYED RELEASE ORAL ONCE A DAY
Status: ON HOLD | COMMUNITY

## 2025-06-09 RX ORDER — FERROUS GLUCONATE 324(37.5)
1 TABLET TABLET ORAL
Status: ON HOLD | COMMUNITY

## 2025-06-09 RX ORDER — LEVOTHYROXINE SODIUM 112 UG/1
1 TABLET IN THE MORNING ON AN EMPTY STOMACH TABLET ORAL ONCE A DAY
Status: ACTIVE | COMMUNITY

## 2025-06-09 RX ORDER — FAMOTIDINE 40 MG/1
TABLET ORAL
Qty: 90 TABLET | Status: ACTIVE | COMMUNITY

## 2025-06-09 RX ORDER — AMLODIPINE AND OLMESARTAN MEDOXOMIL 5; 40 MG/1; MG/1
TAKE ONE TABLET BY MOUTH ONE TIME DAILY TABLET ORAL
Qty: 90 UNSPECIFIED | Refills: 1 | Status: ACTIVE | COMMUNITY

## 2025-06-09 RX ORDER — LANSOPRAZOLE 30 MG/1
TAKE ONE CAPSULE BY MOUTH EVERY MORNING CAPSULE, DELAYED RELEASE ORAL
Qty: 90 UNSPECIFIED | Refills: 1 | Status: ON HOLD | COMMUNITY

## 2025-06-09 RX ORDER — SUCRALFATE 1 G/1
TABLET ORAL
Qty: 360 TABLET | Status: ACTIVE | COMMUNITY

## 2025-06-09 RX ORDER — COLESTIPOL HYDROCHLORIDE 1 G/1
2 TABLETS TABLET, FILM COATED ORAL ONCE A DAY
Qty: 60 | Refills: 3 | Status: ON HOLD | COMMUNITY
Start: 2023-11-15

## 2025-06-09 NOTE — HPI-TODAY'S VISIT:
The patient is an 84-year-old woman with history of Crohn's disease status post resection with ileocolonic anastomosis in the transverse colon who has been complaining of bloating and diarrhea.  CT scan was done which showed multiple areas of small bowel inflammation suggestive of enterocolitis.  A colonoscopy was then performed which was incomplete.  The scope was advanced to a level of stricture at the ileocolonic anastomosis.  Virtual colonoscopy was ordered which reveals a stricture in the transverse colon at the level of anastomosis as well as multiple possible fistulous in the ileum.  The patient is not on any maintenance medication for Crohn's disease.

## 2025-06-12 LAB — HEPATITIS B CORE AB TOTAL: (no result)

## 2025-06-14 LAB
A/G RATIO: 1.4
ABSOLUTE BASOPHILS: 50
ABSOLUTE EOSINOPHILS: 50
ABSOLUTE LYMPHOCYTES: 1198
ABSOLUTE MONOCYTES: 386
ABSOLUTE NEUTROPHILS: 1617
ALBUMIN: 3
ALKALINE PHOSPHATASE: 53
ALT (SGPT): 19
AST (SGOT): 19
BASOPHILS: 1.5
BILIRUBIN, TOTAL: 0.3
BUN/CREATININE RATIO: (no result)
BUN: 12
C-REACTIVE PROTEIN, QUANT: <3
CALCIUM: 7.8
CARBON DIOXIDE, TOTAL: 30
CHLORIDE: 106
CREATININE: 0.87
EGFR: 65
EOSINOPHILS: 1.5
GLOBULIN, TOTAL: 2.2
GLUCOSE: 97
HEMATOCRIT: 37.4
HEMOGLOBIN: 11.4
HEP B CORE AB, IGM: (no result)
HEPATITIS B CORE AB TOTAL: (no result)
HEPATITIS B SURFACE ANTIBODY QL: (no result)
HEPATITIS B SURFACE ANTIGEN: (no result)
LYMPHOCYTES: 36.3
MCH: 33
MCHC: 30.5
MCV: 108.4
MITOGEN-NIL: 7.67
MONOCYTES: 11.7
MPV: 9.3
NEUTROPHILS: 49
PLATELET COUNT: 385
POTASSIUM: 4.5
PROTEIN, TOTAL: 5.2
QUANTIFERON NIL VALUE: 0.01
QUANTIFERON TB1 AG VALUE: 0
QUANTIFERON TB2 AG VALUE: 0
QUANTIFERON-TB GOLD PLUS: NEGATIVE
RDW: 15
RED BLOOD CELL COUNT: 3.45
SODIUM: 141
WHITE BLOOD CELL COUNT: 3.3

## 2025-06-20 LAB — CALPROTECTIN, FECAL: 322

## 2025-06-23 ENCOUNTER — TELEPHONE ENCOUNTER (OUTPATIENT)
Dept: URBAN - METROPOLITAN AREA CLINIC 7 | Facility: CLINIC | Age: 85
End: 2025-06-23

## 2025-07-01 ENCOUNTER — LAB OUTSIDE AN ENCOUNTER (OUTPATIENT)
Dept: URBAN - METROPOLITAN AREA CLINIC 7 | Facility: CLINIC | Age: 85
End: 2025-07-01

## 2025-07-02 ENCOUNTER — TELEPHONE ENCOUNTER (OUTPATIENT)
Dept: URBAN - METROPOLITAN AREA CLINIC 7 | Facility: CLINIC | Age: 85
End: 2025-07-02

## 2025-07-02 ENCOUNTER — OFFICE VISIT (OUTPATIENT)
Dept: URBAN - METROPOLITAN AREA CLINIC 7 | Facility: CLINIC | Age: 85
End: 2025-07-02
Payer: COMMERCIAL

## 2025-07-02 DIAGNOSIS — K52.9 CHRONIC DIARRHEA: ICD-10-CM

## 2025-07-02 DIAGNOSIS — K50.813 CROHN'S DISEASE OF BOTH SMALL AND LARGE INTESTINE WITH FISTULA: ICD-10-CM

## 2025-07-02 DIAGNOSIS — K91.30 ANASTOMOTIC STRICTURE OF COLORECTAL REGION: ICD-10-CM

## 2025-07-02 PROBLEM — 71833008: Status: ACTIVE | Noted: 2025-07-02

## 2025-07-02 PROCEDURE — 99214 OFFICE O/P EST MOD 30 MIN: CPT | Performed by: INTERNAL MEDICINE

## 2025-07-02 RX ORDER — COLESTIPOL HYDROCHLORIDE 1 G/1
2 TABLETS TABLET, FILM COATED ORAL ONCE A DAY
Qty: 60 | Refills: 3 | Status: ON HOLD | COMMUNITY
Start: 2023-11-15

## 2025-07-02 RX ORDER — PANTOPRAZOLE SODIUM 40 MG/1
1 TABLET 1/2 TO 1 HOUR BEFORE MORNING MEAL TABLET, DELAYED RELEASE ORAL TWICE A DAY
Qty: 28 TABLET | Refills: 0 | Status: ON HOLD | COMMUNITY
Start: 2025-05-21

## 2025-07-02 RX ORDER — FERROUS GLUCONATE 324(37.5)
1 TABLET TABLET ORAL
Status: ON HOLD | COMMUNITY

## 2025-07-02 RX ORDER — LEVOTHYROXINE SODIUM 112 UG/1
1 TABLET IN THE MORNING ON AN EMPTY STOMACH TABLET ORAL ONCE A DAY
Status: ACTIVE | COMMUNITY

## 2025-07-02 RX ORDER — PAROXETINE HYDROCHLORIDE HEMIHYDRATE 10 MG/1
1 TABLET IN THE MORNING TABLET, FILM COATED ORAL ONCE A DAY
Status: ACTIVE | COMMUNITY

## 2025-07-02 RX ORDER — SUCRALFATE 1 G/1
TABLET ORAL
Qty: 360 TABLET | Status: ACTIVE | COMMUNITY

## 2025-07-02 RX ORDER — GUSELKUMAB 200 MG/2ML
4ML INJECTION SUBCUTANEOUS
Qty: 2 | Refills: 2 | OUTPATIENT
Start: 2025-07-16 | End: 2025-10-14

## 2025-07-02 RX ORDER — AMLODIPINE AND OLMESARTAN MEDOXOMIL 5; 40 MG/1; MG/1
TAKE ONE TABLET BY MOUTH ONE TIME DAILY TABLET ORAL
Qty: 90 UNSPECIFIED | Refills: 1 | Status: ACTIVE | COMMUNITY

## 2025-07-02 RX ORDER — OMEPRAZOLE 20 MG/1
1 CAPSULE 30 MINUTES BEFORE MORNING MEAL CAPSULE, DELAYED RELEASE ORAL ONCE A DAY
Status: ON HOLD | COMMUNITY

## 2025-07-02 RX ORDER — LANSOPRAZOLE 30 MG/1
TAKE ONE CAPSULE BY MOUTH EVERY MORNING CAPSULE, DELAYED RELEASE ORAL
Qty: 90 UNSPECIFIED | Refills: 1 | Status: ON HOLD | COMMUNITY

## 2025-07-02 RX ORDER — FAMOTIDINE 40 MG/1
TABLET ORAL
Qty: 90 TABLET | Status: ACTIVE | COMMUNITY

## 2025-07-02 NOTE — HPI-TODAY'S VISIT:
Seen now after recent visit here where had syncopal episode for which went to [facility] . Eval was negative. Had MRE yesterday. Having ongoing and recurrent chronic diarrheal sxs. No bleeding. No fever or chills No nausea or vomiting. Has  history of Crohn's disease status post resection with ileocolonic anastomosis in the transverse colon who has been complaining of bloating and diarrhea.   CT scan was done which showed multiple areas of small bowel inflammation suggestive of enterocolitis.   A colonoscopy was then performed which was incomplete.   The scope was advanced to a level of stricture at the ileocolonic anastomosis.  Virtual colonoscopy was ordered which reveals a stricture in the transverse colon at the level of anastomosis as well as multiple possible fistulous in the ileum.  The patient is not on any maintenance medication for Crohn's disease. No prior biologic rx Has received steroids in the past.

## 2025-07-16 ENCOUNTER — OFFICE VISIT (OUTPATIENT)
Dept: URBAN - METROPOLITAN AREA CLINIC 7 | Facility: CLINIC | Age: 85
End: 2025-07-16
Payer: COMMERCIAL

## 2025-07-16 DIAGNOSIS — K50.813 CROHN'S DISEASE OF BOTH SMALL AND LARGE INTESTINE WITH FISTULA: ICD-10-CM

## 2025-07-16 DIAGNOSIS — R93.3 GASTROINTESTINAL TRACT IMAGING ABNORMALITY: ICD-10-CM

## 2025-07-16 DIAGNOSIS — E07.9 THYROID DISEASE: ICD-10-CM

## 2025-07-16 DIAGNOSIS — R19.7 DIARRHEA: ICD-10-CM

## 2025-07-16 DIAGNOSIS — M62.89 PELVIC FLOOR DYSFUNCTION: ICD-10-CM

## 2025-07-16 DIAGNOSIS — K90.0 ACD (ADULT CELIAC DISEASE): ICD-10-CM

## 2025-07-16 PROCEDURE — 99214 OFFICE O/P EST MOD 30 MIN: CPT | Performed by: INTERNAL MEDICINE

## 2025-07-16 RX ORDER — LANSOPRAZOLE 30 MG/1
TAKE ONE CAPSULE BY MOUTH EVERY MORNING CAPSULE, DELAYED RELEASE ORAL
Qty: 90 UNSPECIFIED | Refills: 1 | Status: ON HOLD | COMMUNITY

## 2025-07-16 RX ORDER — AMLODIPINE AND OLMESARTAN MEDOXOMIL 5; 40 MG/1; MG/1
TAKE ONE TABLET BY MOUTH ONE TIME DAILY TABLET ORAL
Qty: 90 UNSPECIFIED | Refills: 1 | Status: ACTIVE | COMMUNITY

## 2025-07-16 RX ORDER — FERROUS GLUCONATE 324(37.5)
1 TABLET TABLET ORAL
Status: ON HOLD | COMMUNITY

## 2025-07-16 RX ORDER — PAROXETINE HYDROCHLORIDE HEMIHYDRATE 10 MG/1
1 TABLET IN THE MORNING TABLET, FILM COATED ORAL ONCE A DAY
Status: ACTIVE | COMMUNITY

## 2025-07-16 RX ORDER — SUCRALFATE 1 G/1
TABLET ORAL
Qty: 360 TABLET | Status: ACTIVE | COMMUNITY

## 2025-07-16 RX ORDER — OMEPRAZOLE 20 MG/1
1 CAPSULE 30 MINUTES BEFORE MORNING MEAL CAPSULE, DELAYED RELEASE ORAL ONCE A DAY
Status: ON HOLD | COMMUNITY

## 2025-07-16 RX ORDER — LEVOTHYROXINE SODIUM 112 UG/1
1 TABLET IN THE MORNING ON AN EMPTY STOMACH TABLET ORAL ONCE A DAY
Status: ACTIVE | COMMUNITY

## 2025-07-16 RX ORDER — FAMOTIDINE 40 MG/1
TABLET ORAL
Qty: 90 TABLET | Status: ACTIVE | COMMUNITY

## 2025-07-16 RX ORDER — PANTOPRAZOLE SODIUM 40 MG/1
1 TABLET 1/2 TO 1 HOUR BEFORE MORNING MEAL TABLET, DELAYED RELEASE ORAL TWICE A DAY
Qty: 28 TABLET | Refills: 0 | Status: ON HOLD | COMMUNITY
Start: 2025-05-21

## 2025-07-16 RX ORDER — BUDESONIDE 9 MG/1
1 TABLET IN THE MORNING TABLET, FILM COATED, EXTENDED RELEASE ORAL ONCE A DAY
Qty: 30 | OUTPATIENT
Start: 2025-07-16

## 2025-07-16 RX ORDER — COLESTIPOL HYDROCHLORIDE 1 G/1
2 TABLETS TABLET, FILM COATED ORAL ONCE A DAY
Qty: 60 | Refills: 3 | Status: ON HOLD | COMMUNITY
Start: 2023-11-15

## 2025-07-16 NOTE — HPI-TODAY'S VISIT:
Seen now with ongoing and recurrent sxs. Having ongoing and recurrent chronic diarrheal sxs with mucus and blood. Script for termphya has been submitted  No bleeding today Pain is recurrent ,vague ,RLQ No fever or chills No nausea or vomiting.  after recent visit here where had syncopal episode for which went to [facility] . Eval was negative. s/p recent  MRE.  Has  history of Crohn's disease status post resection with ileocolonic anastomosis in the transverse colon who has been complaining of bloating and diarrhea.   CT scan was done which showed multiple areas of small bowel inflammation suggestive of enterocolitis.   A colonoscopy was then performed which was incomplete.   The scope was advanced to a level of stricture at the ileocolonic anastomosis.  Virtual colonoscopy was ordered which reveals a stricture in the transverse colon at the level of anastomosis as well as multiple possible fistulous in the ileum.  The patient is not on any maintenance medication for Crohn's disease. No prior biologic rx Has received steroids in the past. pvcs

## 2025-07-17 ENCOUNTER — TELEPHONE ENCOUNTER (OUTPATIENT)
Dept: URBAN - METROPOLITAN AREA CLINIC 7 | Facility: CLINIC | Age: 85
End: 2025-07-17

## 2025-07-17 RX ORDER — GUSELKUMAB 200 MG/2ML
4ML INJECTION SUBCUTANEOUS
Qty: 2 | Refills: 2
Start: 2025-07-16 | End: 2025-10-15

## 2025-07-21 ENCOUNTER — TELEPHONE ENCOUNTER (OUTPATIENT)
Dept: URBAN - METROPOLITAN AREA CLINIC 7 | Facility: CLINIC | Age: 85
End: 2025-07-21

## 2025-07-21 RX ORDER — BUDESONIDE 3 MG/1
3 CAPSULES CAPSULE, DELAYED RELEASE PELLETS ORAL ONCE A DAY
Qty: 168 CAPSULE | Refills: 0 | OUTPATIENT
Start: 2025-07-21

## 2025-07-22 ENCOUNTER — TELEPHONE ENCOUNTER (OUTPATIENT)
Dept: URBAN - METROPOLITAN AREA CLINIC 7 | Facility: CLINIC | Age: 85
End: 2025-07-22

## 2025-07-22 RX ORDER — PREDNISOLONE 5 MG/1
4 TABLET IN THE MORNING WITH FOOD OR MILK DAILY FOR 2 WEEKS, THEN 3 TABLETS DAILY FOR 1 WEEK, THEN 2 TABLETS DAILY FOR 1 WEEK, THEN 1 TABLET DAILY FOR A WEEK TABLET ORAL ONCE A DAY
Qty: 98 | Refills: 0
Start: 2025-07-25

## 2025-07-30 ENCOUNTER — TELEPHONE ENCOUNTER (OUTPATIENT)
Dept: URBAN - METROPOLITAN AREA CLINIC 7 | Facility: CLINIC | Age: 85
End: 2025-07-30

## 2025-08-15 ENCOUNTER — TELEPHONE ENCOUNTER (OUTPATIENT)
Dept: URBAN - METROPOLITAN AREA CLINIC 7 | Facility: CLINIC | Age: 85
End: 2025-08-15

## 2025-08-22 ENCOUNTER — OFFICE VISIT (OUTPATIENT)
Dept: URBAN - METROPOLITAN AREA CLINIC 7 | Facility: CLINIC | Age: 85
End: 2025-08-22

## 2025-08-22 ENCOUNTER — OFFICE VISIT (OUTPATIENT)
Dept: URBAN - METROPOLITAN AREA CLINIC 7 | Facility: CLINIC | Age: 85
End: 2025-08-22
Payer: COMMERCIAL

## 2025-08-22 DIAGNOSIS — K50.90 CROHN'S DISEASE: ICD-10-CM

## 2025-08-22 DIAGNOSIS — R19.7 DIARRHEA: ICD-10-CM

## 2025-08-22 PROCEDURE — 99214 OFFICE O/P EST MOD 30 MIN: CPT

## 2025-08-22 RX ORDER — BUDESONIDE 9 MG/1
1 TABLET IN THE MORNING TABLET, FILM COATED, EXTENDED RELEASE ORAL ONCE A DAY
Qty: 30 | Status: DISCONTINUED | COMMUNITY
Start: 2025-07-16

## 2025-08-22 RX ORDER — SUCRALFATE 1 G/1
TABLET ORAL
Qty: 360 TABLET | Status: ACTIVE | COMMUNITY

## 2025-08-22 RX ORDER — FAMOTIDINE 40 MG/1
TABLET ORAL
Qty: 90 TABLET | Status: ACTIVE | COMMUNITY

## 2025-08-22 RX ORDER — LEVOTHYROXINE SODIUM 112 UG/1
1 TABLET IN THE MORNING ON AN EMPTY STOMACH TABLET ORAL ONCE A DAY
Status: ACTIVE | COMMUNITY

## 2025-08-22 RX ORDER — PANTOPRAZOLE SODIUM 40 MG/1
1 TABLET 1/2 TO 1 HOUR BEFORE MORNING MEAL TABLET, DELAYED RELEASE ORAL TWICE A DAY
Qty: 28 TABLET | Refills: 0 | Status: ON HOLD | COMMUNITY
Start: 2025-05-21

## 2025-08-22 RX ORDER — PAROXETINE HYDROCHLORIDE HEMIHYDRATE 10 MG/1
1 TABLET IN THE MORNING TABLET, FILM COATED ORAL ONCE A DAY
Status: ACTIVE | COMMUNITY

## 2025-08-22 RX ORDER — PREDNISOLONE 5 MG/1
4 TABLET IN THE MORNING WITH FOOD OR MILK DAILY FOR 2 WEEKS, THEN 3 TABLETS DAILY FOR 1 WEEK, THEN 2 TABLETS DAILY FOR 1 WEEK, THEN 1 TABLET DAILY FOR A WEEK TABLET ORAL ONCE A DAY
Qty: 98 | Refills: 0 | Status: ACTIVE | COMMUNITY
Start: 2025-07-25

## 2025-08-22 RX ORDER — AMLODIPINE AND OLMESARTAN MEDOXOMIL 5; 40 MG/1; MG/1
TAKE ONE TABLET BY MOUTH ONE TIME DAILY TABLET ORAL
Qty: 90 UNSPECIFIED | Refills: 1 | Status: ACTIVE | COMMUNITY

## 2025-08-22 RX ORDER — FERROUS GLUCONATE 324(37.5)
1 TABLET TABLET ORAL
Status: ON HOLD | COMMUNITY

## 2025-08-22 RX ORDER — OMEPRAZOLE 20 MG/1
1 CAPSULE 30 MINUTES BEFORE MORNING MEAL CAPSULE, DELAYED RELEASE ORAL ONCE A DAY
Status: ON HOLD | COMMUNITY

## 2025-08-22 RX ORDER — COLESTIPOL HYDROCHLORIDE 1 G/1
2 TABLETS TABLET, FILM COATED ORAL ONCE A DAY
Qty: 60 | Refills: 3 | Status: ON HOLD | COMMUNITY
Start: 2023-11-15

## 2025-08-22 RX ORDER — GUSELKUMAB 200 MG/2ML
4ML INJECTION SUBCUTANEOUS
Qty: 2 | Refills: 2 | Status: ACTIVE | COMMUNITY
Start: 2025-07-16 | End: 2025-10-15

## 2025-08-22 RX ORDER — BUDESONIDE 3 MG/1
3 CAPSULES CAPSULE, DELAYED RELEASE PELLETS ORAL ONCE A DAY
Qty: 168 CAPSULE | Refills: 0 | Status: DISCONTINUED | COMMUNITY
Start: 2025-07-21

## 2025-08-22 RX ORDER — LANSOPRAZOLE 30 MG/1
TAKE ONE CAPSULE BY MOUTH EVERY MORNING CAPSULE, DELAYED RELEASE ORAL
Qty: 90 UNSPECIFIED | Refills: 1 | Status: ON HOLD | COMMUNITY

## 2025-08-22 RX ORDER — SUCRALFATE 1 G/1
TABLET ORAL
Qty: 360 TABLET | Status: DISCONTINUED | COMMUNITY

## 2025-08-22 RX ORDER — BUDESONIDE 9 MG/1
1 TABLET IN THE MORNING TABLET, FILM COATED, EXTENDED RELEASE ORAL ONCE A DAY
Qty: 30 | Status: ACTIVE | COMMUNITY
Start: 2025-07-16

## 2025-08-22 RX ORDER — BUDESONIDE 3 MG/1
3 CAPSULES CAPSULE, DELAYED RELEASE PELLETS ORAL ONCE A DAY
Qty: 168 CAPSULE | Refills: 0 | Status: ACTIVE | COMMUNITY
Start: 2025-07-21